# Patient Record
Sex: MALE | Race: WHITE | NOT HISPANIC OR LATINO | Employment: FULL TIME | URBAN - METROPOLITAN AREA
[De-identification: names, ages, dates, MRNs, and addresses within clinical notes are randomized per-mention and may not be internally consistent; named-entity substitution may affect disease eponyms.]

---

## 2021-11-17 ENCOUNTER — HOSPITAL ENCOUNTER (EMERGENCY)
Facility: HOSPITAL | Age: 36
Discharge: HOME | End: 2021-11-17
Attending: EMERGENCY MEDICINE | Admitting: EMERGENCY MEDICINE
Payer: COMMERCIAL

## 2021-11-17 VITALS
HEART RATE: 78 BPM | TEMPERATURE: 98.2 F | HEIGHT: 67 IN | BODY MASS INDEX: 26.37 KG/M2 | SYSTOLIC BLOOD PRESSURE: 141 MMHG | OXYGEN SATURATION: 100 % | RESPIRATION RATE: 18 BRPM | DIASTOLIC BLOOD PRESSURE: 94 MMHG | WEIGHT: 168 LBS

## 2021-11-17 DIAGNOSIS — K08.89 PAIN, DENTAL: Primary | ICD-10-CM

## 2021-11-17 PROCEDURE — 96372 THER/PROPH/DIAG INJ SC/IM: CPT

## 2021-11-17 PROCEDURE — 99284 EMERGENCY DEPT VISIT MOD MDM: CPT | Performed by: EMERGENCY MEDICINE

## 2021-11-17 PROCEDURE — 99282 EMERGENCY DEPT VISIT SF MDM: CPT

## 2021-11-17 RX ORDER — CHLORHEXIDINE GLUCONATE 0.12 MG/ML
15 RINSE ORAL 2 TIMES DAILY
Qty: 120 ML | Refills: 0 | Status: SHIPPED | OUTPATIENT
Start: 2021-11-17

## 2021-11-17 RX ORDER — KETOROLAC TROMETHAMINE 30 MG/ML
30 INJECTION, SOLUTION INTRAMUSCULAR; INTRAVENOUS ONCE
Status: COMPLETED | OUTPATIENT
Start: 2021-11-17 | End: 2021-11-17

## 2021-11-17 RX ADMIN — KETOROLAC TROMETHAMINE 30 MG: 30 INJECTION, SOLUTION INTRAMUSCULAR at 02:40

## 2022-04-10 ENCOUNTER — APPOINTMENT (EMERGENCY)
Dept: RADIOLOGY | Facility: HOSPITAL | Age: 37
End: 2022-04-10
Payer: COMMERCIAL

## 2022-04-10 ENCOUNTER — HOSPITAL ENCOUNTER (EMERGENCY)
Facility: HOSPITAL | Age: 37
Discharge: HOME | End: 2022-04-10
Attending: EMERGENCY MEDICINE | Admitting: EMERGENCY MEDICINE
Payer: COMMERCIAL

## 2022-04-10 ENCOUNTER — APPOINTMENT (EMERGENCY)
Dept: CT IMAGING | Facility: HOSPITAL | Age: 37
End: 2022-04-10
Payer: COMMERCIAL

## 2022-04-10 VITALS
BODY MASS INDEX: 26.37 KG/M2 | OXYGEN SATURATION: 97 % | DIASTOLIC BLOOD PRESSURE: 70 MMHG | WEIGHT: 168 LBS | SYSTOLIC BLOOD PRESSURE: 120 MMHG | HEART RATE: 95 BPM | HEIGHT: 67 IN | TEMPERATURE: 97.9 F | RESPIRATION RATE: 13 BRPM

## 2022-04-10 DIAGNOSIS — Q27.8 ABNORMALITY OF SYSTEMIC VEIN: ICD-10-CM

## 2022-04-10 DIAGNOSIS — R07.89 ATYPICAL CHEST PAIN: Primary | ICD-10-CM

## 2022-04-10 LAB
ALBUMIN SERPL BCP-MCNC: 3.7 G/DL (ref 3.5–5)
ALP SERPL-CCNC: 112 U/L (ref 46–116)
ALT SERPL W P-5'-P-CCNC: 54 U/L (ref 12–78)
ANION GAP SERPL CALCULATED.3IONS-SCNC: 3 MMOL/L (ref 4–13)
AST SERPL W P-5'-P-CCNC: 21 U/L (ref 5–45)
ATRIAL RATE: 95 BPM
ATRIAL RATE: 96 BPM
BASOPHILS # BLD AUTO: 0.04 THOUSANDS/ΜL (ref 0–0.1)
BASOPHILS NFR BLD AUTO: 0 % (ref 0–1)
BILIRUB SERPL-MCNC: 0.5 MG/DL (ref 0.2–1)
BILIRUB UR QL STRIP: NEGATIVE
BUN SERPL-MCNC: 12 MG/DL (ref 5–25)
CALCIUM SERPL-MCNC: 8.9 MG/DL (ref 8.3–10.1)
CARDIAC TROPONIN I PNL SERPL HS: <2 NG/L
CHLORIDE SERPL-SCNC: 101 MMOL/L (ref 100–108)
CLARITY UR: CLEAR
CO2 SERPL-SCNC: 29 MMOL/L (ref 21–32)
COLOR UR: YELLOW
CREAT SERPL-MCNC: 1.11 MG/DL (ref 0.6–1.3)
D DIMER PPP FEU-MCNC: 0.63 UG/ML FEU
EOSINOPHIL # BLD AUTO: 0.06 THOUSAND/ΜL (ref 0–0.61)
EOSINOPHIL NFR BLD AUTO: 1 % (ref 0–6)
ERYTHROCYTE [DISTWIDTH] IN BLOOD BY AUTOMATED COUNT: 12.6 % (ref 11.6–15.1)
GFR SERPL CREATININE-BSD FRML MDRD: 83 ML/MIN/1.73SQ M
GLUCOSE SERPL-MCNC: 98 MG/DL (ref 65–140)
GLUCOSE UR STRIP-MCNC: NEGATIVE MG/DL
HCT VFR BLD AUTO: 48.2 % (ref 36.5–49.3)
HGB BLD-MCNC: 16.3 G/DL (ref 12–17)
HGB UR QL STRIP.AUTO: NEGATIVE
IMM GRANULOCYTES # BLD AUTO: 0.06 THOUSAND/UL (ref 0–0.2)
IMM GRANULOCYTES NFR BLD AUTO: 1 % (ref 0–2)
KETONES UR STRIP-MCNC: NEGATIVE MG/DL
LEUKOCYTE ESTERASE UR QL STRIP: NEGATIVE
LIPASE SERPL-CCNC: 121 U/L (ref 73–393)
LYMPHOCYTES # BLD AUTO: 1.51 THOUSANDS/ΜL (ref 0.6–4.47)
LYMPHOCYTES NFR BLD AUTO: 14 % (ref 14–44)
MCH RBC QN AUTO: 29 PG (ref 26.8–34.3)
MCHC RBC AUTO-ENTMCNC: 33.8 G/DL (ref 31.4–37.4)
MCV RBC AUTO: 86 FL (ref 82–98)
MONOCYTES # BLD AUTO: 1.1 THOUSAND/ΜL (ref 0.17–1.22)
MONOCYTES NFR BLD AUTO: 10 % (ref 4–12)
NEUTROPHILS # BLD AUTO: 8.06 THOUSANDS/ΜL (ref 1.85–7.62)
NEUTS SEG NFR BLD AUTO: 74 % (ref 43–75)
NITRITE UR QL STRIP: NEGATIVE
NRBC BLD AUTO-RTO: 0 /100 WBCS
P AXIS: 64 DEGREES
P AXIS: 67 DEGREES
PH UR STRIP.AUTO: 6 [PH]
PLATELET # BLD AUTO: 343 THOUSANDS/UL (ref 149–390)
PMV BLD AUTO: 9.7 FL (ref 8.9–12.7)
POTASSIUM SERPL-SCNC: 3.6 MMOL/L (ref 3.5–5.3)
PR INTERVAL: 142 MS
PR INTERVAL: 148 MS
PROT SERPL-MCNC: 8.6 G/DL (ref 6.4–8.2)
PROT UR STRIP-MCNC: NEGATIVE MG/DL
QRS AXIS: 66 DEGREES
QRS AXIS: 69 DEGREES
QRSD INTERVAL: 86 MS
QRSD INTERVAL: 88 MS
QT INTERVAL: 334 MS
QT INTERVAL: 346 MS
QTC INTERVAL: 421 MS
QTC INTERVAL: 434 MS
RBC # BLD AUTO: 5.63 MILLION/UL (ref 3.88–5.62)
SODIUM SERPL-SCNC: 133 MMOL/L (ref 136–145)
SP GR UR STRIP.AUTO: >=1.03 (ref 1–1.03)
T WAVE AXIS: 49 DEGREES
T WAVE AXIS: 55 DEGREES
UROBILINOGEN UR QL STRIP.AUTO: 0.2 E.U./DL
VENTRICULAR RATE: 95 BPM
VENTRICULAR RATE: 96 BPM
WBC # BLD AUTO: 10.83 THOUSAND/UL (ref 4.31–10.16)

## 2022-04-10 PROCEDURE — 87591 N.GONORRHOEAE DNA AMP PROB: CPT

## 2022-04-10 PROCEDURE — 93005 ELECTROCARDIOGRAM TRACING: CPT

## 2022-04-10 PROCEDURE — 36415 COLL VENOUS BLD VENIPUNCTURE: CPT

## 2022-04-10 PROCEDURE — 71045 X-RAY EXAM CHEST 1 VIEW: CPT

## 2022-04-10 PROCEDURE — 81003 URINALYSIS AUTO W/O SCOPE: CPT

## 2022-04-10 PROCEDURE — 99285 EMERGENCY DEPT VISIT HI MDM: CPT

## 2022-04-10 PROCEDURE — 93010 ELECTROCARDIOGRAM REPORT: CPT | Performed by: INTERNAL MEDICINE

## 2022-04-10 PROCEDURE — 84484 ASSAY OF TROPONIN QUANT: CPT

## 2022-04-10 PROCEDURE — 83690 ASSAY OF LIPASE: CPT

## 2022-04-10 PROCEDURE — 96361 HYDRATE IV INFUSION ADD-ON: CPT

## 2022-04-10 PROCEDURE — 71275 CT ANGIOGRAPHY CHEST: CPT

## 2022-04-10 PROCEDURE — 85379 FIBRIN DEGRADATION QUANT: CPT

## 2022-04-10 PROCEDURE — 87491 CHLMYD TRACH DNA AMP PROBE: CPT

## 2022-04-10 PROCEDURE — 87636 SARSCOV2 & INF A&B AMP PRB: CPT

## 2022-04-10 PROCEDURE — 74177 CT ABD & PELVIS W/CONTRAST: CPT

## 2022-04-10 PROCEDURE — G1004 CDSM NDSC: HCPCS

## 2022-04-10 PROCEDURE — 85025 COMPLETE CBC W/AUTO DIFF WBC: CPT

## 2022-04-10 PROCEDURE — 96374 THER/PROPH/DIAG INJ IV PUSH: CPT

## 2022-04-10 PROCEDURE — 80053 COMPREHEN METABOLIC PANEL: CPT

## 2022-04-10 RX ORDER — ONDANSETRON 2 MG/ML
4 INJECTION INTRAMUSCULAR; INTRAVENOUS ONCE
Status: COMPLETED | OUTPATIENT
Start: 2022-04-10 | End: 2022-04-10

## 2022-04-10 RX ADMIN — SODIUM CHLORIDE 1000 ML: 0.9 INJECTION, SOLUTION INTRAVENOUS at 13:26

## 2022-04-10 RX ADMIN — ONDANSETRON 4 MG: 2 INJECTION INTRAMUSCULAR; INTRAVENOUS at 13:26

## 2022-04-10 RX ADMIN — IOHEXOL 100 ML: 350 INJECTION, SOLUTION INTRAVENOUS at 15:11

## 2022-04-10 NOTE — ED PROVIDER NOTES
History  Chief Complaint   Patient presents with    Chest Pain     Pt with chest pain x 2 days, denies injury, +n/-v/+d  Pt has been on amox  for a sinus infection  Patient is a 43 YOM who presents to the ED with chest pain, nausea, and diarrhea x2 days  His chest pain is described as a dull ache radiates into his left shoulder  He had been having recurrent congestion and sinusitis for greater than 2 weeks and was seen by [facility] on 4/03 where he was prescribed augmentin  He reports that his congestion has improved over the last few days  He has had a concurrent non-productive cough for the last several days, reports that the cough has decreased in frequency but is still present  He began having bouts of non-bloody diarrhea approximately 2 days ago with associated nausea, states he has had decreased appetite and has been drinking primarily water  Has had subjective fever/chills at home with one oral temperature reading yesterday of 100 3F  Additionally he's had slight increase in urinary frequency with burning  He has been feeling fatigued the last few days without muscle aches  He denies headache, shortness of breath, vomiting, lower leg or calf pain  Prior to Admission Medications   Prescriptions Last Dose Informant Patient Reported? Taking?   chlorhexidine (PERIDEX) 0 12 % solution   No No   Sig: Apply 15 mL to the mouth or throat 2 (two) times a day      Facility-Administered Medications: None       History reviewed  No pertinent past medical history  Past Surgical History:   Procedure Laterality Date    ROOT CANAL         History reviewed  No pertinent family history  I have reviewed and agree with the history as documented      E-Cigarette/Vaping    E-Cigarette Use Current Every Day User      E-Cigarette/Vaping Substances    Nicotine Yes     THC No     CBD No     Flavoring Yes     Other No     Unknown No      Social History     Tobacco Use    Smoking status: Former Smoker    Smokeless tobacco: Never Used   Vaping Use    Vaping Use: Every day    Substances: Nicotine, Flavoring   Substance Use Topics    Alcohol use: Yes     Comment: socially     Drug use: Never       Review of Systems   Constitutional: Positive for appetite change (Decreased appetite due to nausea), chills, fatigue and fever (subjective fever at home)  HENT: Negative for rhinorrhea and sore throat  Eyes: Negative for photophobia and visual disturbance  Respiratory: Positive for cough (dry cough)  Negative for apnea  Cardiovascular: Positive for chest pain  Negative for palpitations and leg swelling  Gastrointestinal: Positive for diarrhea (non-bloody) and nausea  Negative for abdominal pain, blood in stool and vomiting  Genitourinary: Positive for dysuria (burning sensation)  Negative for decreased urine volume and hematuria  Musculoskeletal: Negative for arthralgias, back pain, neck pain and neck stiffness  Skin: Negative for color change and pallor  Neurological: Negative for dizziness, light-headedness, numbness and headaches  Physical Exam  Physical Exam  Constitutional:       General: He is not in acute distress  Appearance: He is well-developed  He is not ill-appearing, toxic-appearing or diaphoretic  HENT:      Head: Normocephalic and atraumatic  Right Ear: Tympanic membrane normal       Left Ear: Tympanic membrane normal       Mouth/Throat:      Mouth: Mucous membranes are moist       Pharynx: Oropharynx is clear  No oropharyngeal exudate or posterior oropharyngeal erythema  Cardiovascular:      Rate and Rhythm: Regular rhythm  Tachycardia present  Pulses:           Radial pulses are 2+ on the right side and 2+ on the left side  Heart sounds: Normal heart sounds  No murmur heard  Pulmonary:      Effort: Pulmonary effort is normal  No accessory muscle usage or respiratory distress        Breath sounds: No decreased breath sounds, wheezing, rhonchi or rales  Abdominal:      General: Bowel sounds are normal       Palpations: Abdomen is soft  There is no mass  Tenderness: There is no abdominal tenderness  There is no right CVA tenderness, left CVA tenderness or guarding  Musculoskeletal:      Cervical back: Normal range of motion  Right lower leg: No tenderness  No edema  Left lower leg: No tenderness  No edema  Skin:     General: Skin is warm and dry  Neurological:      General: No focal deficit present  Mental Status: He is alert and oriented to person, place, and time           Vital Signs  ED Triage Vitals [04/10/22 1221]   Temperature Pulse Respirations Blood Pressure SpO2   97 9 °F (36 6 °C) (!) 106 16 126/89 98 %      Temp Source Heart Rate Source Patient Position - Orthostatic VS BP Location FiO2 (%)   Temporal Monitor Sitting Right arm --      Pain Score       2           Vitals:    04/10/22 1221 04/10/22 1300 04/10/22 1400   BP: 126/89 130/75 123/74   Pulse: (!) 106 87 90   Patient Position - Orthostatic VS: Sitting           Visual Acuity      ED Medications  Medications   ondansetron (ZOFRAN) injection 4 mg (4 mg Intravenous Given 4/10/22 1326)   sodium chloride 0 9 % bolus 1,000 mL (1,000 mL Intravenous New Bag 4/10/22 1326)   iohexol (OMNIPAQUE) 350 MG/ML injection (SINGLE-DOSE) 100 mL (100 mL Intravenous Given 4/10/22 1511)       Diagnostic Studies  Results Reviewed     Procedure Component Value Units Date/Time    HS Troponin I 4hr [[id number]]     Lab Status: No result Specimen: Blood     HS Troponin I 2hr [[id number]]     Lab Status: No result Specimen: Blood     HS Troponin 0hr (reflex protocol) [[id number]]  (Normal) Collected: 04/10/22 1334    Lab Status: Final result Specimen: Blood from Arm, Right Updated: 04/10/22 1422     hs TnI 0hr <2 ng/L     Comprehensive metabolic panel [[id number]]  (Abnormal) Collected: 04/10/22 1334    Lab Status: Final result Specimen: Blood from Arm, Right Updated: 04/10/22 1413     Sodium 133 mmol/L      Potassium 3 6 mmol/L      Chloride 101 mmol/L      CO2 29 mmol/L      ANION GAP 3 mmol/L      BUN 12 mg/dL      Creatinine 1 11 mg/dL      Glucose 98 mg/dL      Calcium 8 9 mg/dL      AST 21 U/L      ALT 54 U/L      Alkaline Phosphatase 112 U/L      Total Protein 8 6 g/dL      Albumin 3 7 g/dL      Total Bilirubin 0 50 mg/dL      eGFR 83 ml/min/1 73sq m     Narrative:      [place] guidelines for Chronic Kidney Disease (CKD):     Stage 1 with normal or high GFR (GFR > 90 mL/min/1 73 square meters)    Stage 2 Mild CKD (GFR = 60-89 mL/min/1 73 square meters)    Stage 3A Moderate CKD (GFR = 45-59 mL/min/1 73 square meters)    Stage 3B Moderate CKD (GFR = 30-44 mL/min/1 73 square meters)    Stage 4 Severe CKD (GFR = 15-29 mL/min/1 73 square meters)    Stage 5 End Stage CKD (GFR <15 mL/min/1 73 square meters)  Note: GFR calculation is accurate only with a steady state creatinine    Lipase [[id number]]  (Normal) Collected: 04/10/22 1334    Lab Status: Final result Specimen: Blood from Arm, Right Updated: 04/10/22 1413     Lipase 121 u/L     D-dimer, quantitative [[id number]]  (Abnormal) Collected: 04/10/22 1334    Lab Status: Final result Specimen: Blood from Arm, Right Updated: 04/10/22 1412     D-Dimer, Quant 0 63 ug/ml FEU     UA (URINE) with reflex to Scope [[id number]] Collected: 04/10/22 1333    Lab Status: Final result Specimen: Urine, Clean Catch Updated: 04/10/22 1348     Color, UA Yellow     Clarity, UA Clear     Specific Gravity, UA >=1 030     pH, UA 6 0     Leukocytes, UA Negative     Nitrite, UA Negative     Protein, UA Negative mg/dl      Glucose, UA Negative mg/dl      Ketones, UA Negative mg/dl      Urobilinogen, UA 0 2 E U /dl      Bilirubin, UA Negative     Blood, UA Negative    Chlamydia/GC amplified DNA by PCR [[id number]] Collected: 04/10/22 1333    Lab Status:  In process Specimen: Urine, Other Updated: 04/10/22 1344    COVID/FLU - 24 hour TAT [[id number]] Collected: 04/10/22 1333    Lab Status: In process Specimen: Nares from Nasopharyngeal Swab Updated: 04/10/22 1344    CBC and differential [[id number]]  (Abnormal) Collected: 04/10/22 1334    Lab Status: Final result Specimen: Blood from Arm, Right Updated: 04/10/22 1342     WBC 10 83 Thousand/uL      RBC 5 63 Million/uL      Hemoglobin 16 3 g/dL      Hematocrit 48 2 %      MCV 86 fL      MCH 29 0 pg      MCHC 33 8 g/dL      RDW 12 6 %      MPV 9 7 fL      Platelets 710 Thousands/uL      nRBC 0 /100 WBCs      Neutrophils Relative 74 %      Immat GRANS % 1 %      Lymphocytes Relative 14 %      Monocytes Relative 10 %      Eosinophils Relative 1 %      Basophils Relative 0 %      Neutrophils Absolute 8 06 Thousands/µL      Immature Grans Absolute 0 06 Thousand/uL      Lymphocytes Absolute 1 51 Thousands/µL      Monocytes Absolute 1 10 Thousand/µL      Eosinophils Absolute 0 06 Thousand/µL      Basophils Absolute 0 04 Thousands/µL                  PE Study with CT Abdomen and Pelvis with contrast   Final Result by Michelle Watt MD (04/10 1541)      1  Partial anomalous pulmonary venous return, 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  Nonemergent cardiology consultation is recommended for further evaluation   2  No evidence of pulmonary embolism   3  No acute pulmonary disease   4  Scattered air-fluid levels within the colon may correspond to a diarrheal illness  Otherwise no acute inflammatory changes in the abdomen or pelvis  The examination demonstrates a significant  finding and was documented as such in [facility] for liaison and referring practitioner immediate notification           Workstation performed: [id number]         X-ray chest 1 view portable   ED Interpretation by Iman Dominguez PA-C (04/10 1324)   No acute cardiopulmonary pathologies appreciated                 Procedures  ECG 12 Lead Documentation Only    Date/Time: 4/10/2022 2:34 PM  Performed by: Danielle Castellon PA-C  Authorized by: Danielle Castellon PA-C     Indications / Diagnosis:  Chest pain x2 days  ECG reviewed by me, the ED Provider: yes    Patient location:  ED  Previous ECG:     Previous ECG:  Unavailable  Interpretation:     Interpretation: normal    Rate:     ECG rate:  95    ECG rate assessment: normal    Rhythm:     Rhythm: sinus rhythm    Ectopy:     Ectopy: none    QRS:     QRS axis:  Normal  ST segments:     ST segments:  Non-specific  T waves:     T waves: normal               ED Course             HEART Risk Score      Most Recent Value   Heart Score Risk Calculator    History 0 Filed at: 04/10/2022 1530   ECG 1 Filed at: 04/10/2022 1530   Age 0 Filed at: 04/10/2022 1530   Risk Factors 0 Filed at: 04/10/2022 1530   Troponin 0 Filed at: 04/10/2022 1530   HEART Score 1 Filed at: 04/10/2022 1530                [address]  Rule for PE      Most Recent Value   PERC Rule for PE    Age >=50 0 Filed at: 04/10/2022 1430   HR >=100 1 Filed at: 04/10/2022 1430   O2 Sat on room air < 95% 0 Filed at: 04/10/2022 1430   History of PE or DVT 0 Filed at: 04/10/2022 1430   Recent trauma or surgery 0 Filed at: 04/10/2022 1430   Hemoptysis 0 Filed at: 04/10/2022 1430   Exogenous estrogen 0 Filed at: 04/10/2022 1430   Unilateral leg swelling 0 Filed at: 04/10/2022 1430   PERC Rule for PE Results 1 Filed at: 04/10/2022 1430              SBIRT 20yo+      Most Recent Value   SBIRT (23 yo +)    In order to provide better care to our patients, we are screening all of our patients for alcohol and drug use  Would it be okay to ask you these screening questions? 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  Nonemergent cardiology consultation is recommended for further evaluation "  Results discussed with patient, ambulatory referral to cardiology  UA negative for UTI, Urine GC in process will update patient if positive  Patient states nausea improved and he was "feeling better" after zofran and 1L fluid bolus  Patient advised to return to ED if chest pain worsens, SOB develops  Amount and/or Complexity of Data Reviewed  Clinical lab tests: ordered and reviewed  Tests in the radiology section of CPT®: ordered and reviewed  Discuss the patient with other providers: yes    Patient Progress  Patient progress: stable      Disposition  Final diagnoses:   Atypical chest pain   Abnormality of systemic vein     Time reflects when diagnosis was documented in both MDM as applicable and the Disposition within this note     Time User Action Codes Description Comment    4/10/2022  4:00 PM Penny Streeter Add [R07 89] Atypical chest pain     4/10/2022  4:16 PM Penny Streeter Add [Q27 8] Abnormality of systemic vein       ED Disposition     ED Disposition Condition Date/Time Comment    Discharge Stable Sun Apr 10, 2022  4:01 PM Kareen Payne discharge to home/self care              Follow-up Information     Follow up With Specialties Details Why Contact Info Additional [facility] Call in 1 day  [address] [facility] [address]  [address]  Cardiology [phone] [place], [place], [address] [phone]          Patient's Medications   Discharge Prescriptions    No medications on file           PDMP Review None          ED Provider  Electronically Signed by           Iman Dominguez PA-C  04/10/22 0464

## 2022-04-11 LAB
FLUAV RNA RESP QL NAA+PROBE: NEGATIVE
FLUBV RNA RESP QL NAA+PROBE: NEGATIVE
SARS-COV-2 RNA RESP QL NAA+PROBE: NEGATIVE

## 2022-04-14 LAB
C TRACH DNA SPEC QL NAA+PROBE: NEGATIVE
N GONORRHOEA DNA SPEC QL NAA+PROBE: NEGATIVE

## 2023-05-15 ENCOUNTER — HOSPITAL ENCOUNTER (INPATIENT)
Facility: HOSPITAL | Age: 38
LOS: 1 days | Discharge: HOME | End: 2023-05-18
Attending: EMERGENCY MEDICINE | Admitting: INTERNAL MEDICINE

## 2023-05-15 DIAGNOSIS — R19.7 NAUSEA VOMITING AND DIARRHEA: Primary | ICD-10-CM

## 2023-05-15 DIAGNOSIS — R94.31 PROLONGED Q-T INTERVAL ON ECG: ICD-10-CM

## 2023-05-15 DIAGNOSIS — R11.2 NAUSEA VOMITING AND DIARRHEA: Primary | ICD-10-CM

## 2023-05-15 DIAGNOSIS — R00.0 TACHYCARDIA: ICD-10-CM

## 2023-05-15 PROBLEM — K21.9 GERD (GASTROESOPHAGEAL REFLUX DISEASE): Status: ACTIVE | Noted: 2023-05-15

## 2023-05-15 PROBLEM — Z72.0 TOBACCO ABUSE: Status: ACTIVE | Noted: 2023-05-15

## 2023-05-15 PROBLEM — R74.8 ELEVATED CREATINE KINASE: Status: ACTIVE | Noted: 2023-05-15

## 2023-05-15 PROBLEM — B97.89 VIRAL SEPSIS (HCC): Status: ACTIVE | Noted: 2023-05-15

## 2023-05-15 PROBLEM — A41.89 VIRAL SEPSIS (HCC): Status: ACTIVE | Noted: 2023-05-15

## 2023-05-15 PROBLEM — F41.9 ANXIETY AND DEPRESSION: Status: ACTIVE | Noted: 2023-05-15

## 2023-05-15 PROBLEM — Q26.3 PARTIAL ANOMALOUS PULMONARY VENOUS RETURN (PAPVR): Status: ACTIVE | Noted: 2023-05-15

## 2023-05-15 PROBLEM — F32.A ANXIETY AND DEPRESSION: Status: ACTIVE | Noted: 2023-05-15

## 2023-05-15 LAB
2HR DELTA HS TROPONIN: 2 NG/L
ALBUMIN SERPL BCP-MCNC: 4.9 G/DL (ref 3.5–5)
ALP SERPL-CCNC: 63 U/L (ref 34–104)
ALT SERPL W P-5'-P-CCNC: 48 U/L (ref 7–52)
ANION GAP SERPL CALCULATED.3IONS-SCNC: 12 MMOL/L (ref 4–13)
AST SERPL W P-5'-P-CCNC: 21 U/L (ref 13–39)
BASOPHILS # BLD AUTO: 0.03 THOUSANDS/ÂΜL (ref 0–0.1)
BASOPHILS NFR BLD AUTO: 0 % (ref 0–1)
BILIRUB SERPL-MCNC: 1.11 MG/DL (ref 0.2–1)
BUN SERPL-MCNC: 30 MG/DL (ref 5–25)
CALCIUM SERPL-MCNC: 9.3 MG/DL (ref 8.4–10.2)
CARDIAC TROPONIN I PNL SERPL HS: 3 NG/L
CARDIAC TROPONIN I PNL SERPL HS: 5 NG/L
CHLORIDE SERPL-SCNC: 96 MMOL/L (ref 96–108)
CO2 SERPL-SCNC: 26 MMOL/L (ref 21–32)
CREAT SERPL-MCNC: 1.38 MG/DL (ref 0.6–1.3)
D DIMER PPP FEU-MCNC: 0.32 UG/ML FEU
EOSINOPHIL # BLD AUTO: 0 THOUSAND/ÂΜL (ref 0–0.61)
EOSINOPHIL NFR BLD AUTO: 0 % (ref 0–6)
ERYTHROCYTE [DISTWIDTH] IN BLOOD BY AUTOMATED COUNT: 12.8 % (ref 11.6–15.1)
GFR SERPL CREATININE-BSD FRML MDRD: 63 ML/MIN/1.73SQ M
GLUCOSE SERPL-MCNC: 113 MG/DL (ref 65–140)
HCT VFR BLD AUTO: 50.8 % (ref 36.5–49.3)
HGB BLD-MCNC: 17.1 G/DL (ref 12–17)
IMM GRANULOCYTES # BLD AUTO: 0.07 THOUSAND/UL (ref 0–0.2)
IMM GRANULOCYTES NFR BLD AUTO: 0 % (ref 0–2)
LIPASE SERPL-CCNC: 19 U/L (ref 11–82)
LYMPHOCYTES # BLD AUTO: 0.79 THOUSANDS/ÂΜL (ref 0.6–4.47)
LYMPHOCYTES NFR BLD AUTO: 5 % (ref 14–44)
MCH RBC QN AUTO: 29 PG (ref 26.8–34.3)
MCHC RBC AUTO-ENTMCNC: 33.7 G/DL (ref 31.4–37.4)
MCV RBC AUTO: 86 FL (ref 82–98)
MONOCYTES # BLD AUTO: 1.74 THOUSAND/ÂΜL (ref 0.17–1.22)
MONOCYTES NFR BLD AUTO: 10 % (ref 4–12)
NEUTROPHILS # BLD AUTO: 14.23 THOUSANDS/ÂΜL (ref 1.85–7.62)
NEUTS SEG NFR BLD AUTO: 85 % (ref 43–75)
NRBC BLD AUTO-RTO: 0 /100 WBCS
PLATELET # BLD AUTO: 252 THOUSANDS/UL (ref 149–390)
PMV BLD AUTO: 10.5 FL (ref 8.9–12.7)
POTASSIUM SERPL-SCNC: 3.4 MMOL/L (ref 3.5–5.3)
PROT SERPL-MCNC: 8.5 G/DL (ref 6.4–8.4)
RBC # BLD AUTO: 5.9 MILLION/UL (ref 3.88–5.62)
SODIUM SERPL-SCNC: 134 MMOL/L (ref 135–147)
WBC # BLD AUTO: 16.86 THOUSAND/UL (ref 4.31–10.16)

## 2023-05-15 RX ORDER — BUPROPION HYDROCHLORIDE 150 MG/1
150 TABLET ORAL
Status: DISCONTINUED | OUTPATIENT
Start: 2023-05-16 | End: 2023-05-18 | Stop reason: HOSPADM

## 2023-05-15 RX ORDER — SODIUM CHLORIDE 9 MG/ML
100 INJECTION, SOLUTION INTRAVENOUS CONTINUOUS
Status: DISCONTINUED | OUTPATIENT
Start: 2023-05-15 | End: 2023-05-17

## 2023-05-15 RX ORDER — ESCITALOPRAM OXALATE 10 MG/1
20 TABLET ORAL
Status: DISCONTINUED | OUTPATIENT
Start: 2023-05-16 | End: 2023-05-18 | Stop reason: HOSPADM

## 2023-05-15 RX ORDER — DIPHENHYDRAMINE HYDROCHLORIDE 50 MG/ML
25 INJECTION INTRAMUSCULAR; INTRAVENOUS ONCE
Status: COMPLETED | OUTPATIENT
Start: 2023-05-15 | End: 2023-05-15

## 2023-05-15 RX ORDER — PANTOPRAZOLE SODIUM 40 MG/10ML
40 INJECTION, POWDER, LYOPHILIZED, FOR SOLUTION INTRAVENOUS
Status: DISCONTINUED | OUTPATIENT
Start: 2023-05-15 | End: 2023-05-18 | Stop reason: HOSPADM

## 2023-05-15 RX ORDER — BUPROPION HYDROCHLORIDE 150 MG/1
150 TABLET ORAL
COMMUNITY

## 2023-05-15 RX ORDER — MAGNESIUM SULFATE 1 G/100ML
1 INJECTION INTRAVENOUS ONCE
Status: COMPLETED | OUTPATIENT
Start: 2023-05-15 | End: 2023-05-16

## 2023-05-15 RX ORDER — ESCITALOPRAM OXALATE 20 MG/1
20 TABLET ORAL
COMMUNITY

## 2023-05-15 RX ORDER — POTASSIUM CHLORIDE 20 MEQ/1
40 TABLET, EXTENDED RELEASE ORAL ONCE
Status: COMPLETED | OUTPATIENT
Start: 2023-05-15 | End: 2023-05-15

## 2023-05-15 RX ORDER — ACETAMINOPHEN 325 MG/1
650 TABLET ORAL EVERY 6 HOURS PRN
Status: DISCONTINUED | OUTPATIENT
Start: 2023-05-15 | End: 2023-05-18 | Stop reason: HOSPADM

## 2023-05-15 RX ORDER — ONDANSETRON 2 MG/ML
4 INJECTION INTRAMUSCULAR; INTRAVENOUS ONCE
Status: COMPLETED | OUTPATIENT
Start: 2023-05-15 | End: 2023-05-15

## 2023-05-15 RX ADMIN — ONDANSETRON 4 MG: 2 INJECTION INTRAMUSCULAR; INTRAVENOUS at 18:10

## 2023-05-15 RX ADMIN — SODIUM CHLORIDE 100 ML/HR: 0.9 INJECTION, SOLUTION INTRAVENOUS at 22:30

## 2023-05-15 RX ADMIN — POTASSIUM CHLORIDE 40 MEQ: 1500 TABLET, EXTENDED RELEASE ORAL at 23:54

## 2023-05-15 RX ADMIN — PANTOPRAZOLE SODIUM 40 MG: 40 INJECTION, POWDER, FOR SOLUTION INTRAVENOUS at 22:30

## 2023-05-15 RX ADMIN — DIPHENHYDRAMINE HYDROCHLORIDE 25 MG: 50 INJECTION, SOLUTION INTRAMUSCULAR; INTRAVENOUS at 22:55

## 2023-05-15 RX ADMIN — SODIUM CHLORIDE 1000 ML: 0.9 INJECTION, SOLUTION INTRAVENOUS at 20:00

## 2023-05-15 RX ADMIN — MAGNESIUM SULFATE IN DEXTROSE 1 G: 10 INJECTION, SOLUTION INTRAVENOUS at 23:54

## 2023-05-15 RX ADMIN — TRIMETHOBENZAMIDE HYDROCHLORIDE 200 MG: 100 INJECTION INTRAMUSCULAR at 19:07

## 2023-05-15 RX ADMIN — SODIUM CHLORIDE 1000 ML: 0.9 INJECTION, SOLUTION INTRAVENOUS at 18:09

## 2023-05-15 NOTE — ED PROVIDER NOTES
History  Chief Complaint   Patient presents with   • Vomiting     Pt to ED from home c/o vomiting and diarrhea x 2 days  No sick contacts, states he had wings from [employer] a few days ago and then got sick  Wife had same wings but she is not vomiting  Pt states he is barely able to keep anything down, forcing fluids but feels very nauseous     Patient is a 49-year-old male who presents with nausea, vomiting, diarrhea x48 hours  Patient states that his son was sick last week, but then recovered  Currently no one else is sick around him  He states that 2 days ago he developed nausea, multiple episodes of nonbloody, nonbilious emesis as well as numerous episodes of nonbloody, nonmucoid diarrhea  He states that anytime he tries to eat or drink anything he immediately vomits it back up  He has been feeling extremely tired due to all of this  He denies any abdominal pain  Prior to Admission Medications   Prescriptions Last Dose Informant Patient Reported? Taking?   chlorhexidine (PERIDEX) 0 12 % solution   No No   Sig: Apply 15 mL to the mouth or throat 2 (two) times a day      Facility-Administered Medications: None       Past Medical History:   Diagnosis Date   • ADHD    • Anxiety        Past Surgical History:   Procedure Laterality Date   • ROOT CANAL         History reviewed  No pertinent family history  I have reviewed and agree with the history as documented  E-Cigarette/Vaping   • E-Cigarette Use Current Every Day User      E-Cigarette/Vaping Substances   • Nicotine Yes    • THC No    • CBD No    • Flavoring Yes    • Other No    • Unknown No      Social History     Tobacco Use   • Smoking status: Former   • Smokeless tobacco: Never   Vaping Use   • Vaping Use: Every day   • Substances: Nicotine, Flavoring   Substance Use Topics   • Alcohol use: Yes     Comment: socially    • Drug use: Never       Review of Systems   Constitutional: Negative for chills and fever     Respiratory: Negative for shortness of breath  Cardiovascular: Negative for chest pain  Gastrointestinal: Positive for diarrhea, nausea and vomiting  Negative for abdominal distention, abdominal pain, blood in stool and constipation  Genitourinary: Negative for dysuria, flank pain and hematuria  Neurological: Negative for dizziness, syncope and light-headedness  Physical Exam  Physical Exam  Vitals and nursing note reviewed  Constitutional:       General: He is not in acute distress  Appearance: Normal appearance  He is not ill-appearing, toxic-appearing or diaphoretic  HENT:      Head: Normocephalic and atraumatic  Mouth/Throat:      Mouth: Mucous membranes are moist    Eyes:      Conjunctiva/sclera: Conjunctivae normal       Pupils: Pupils are equal, round, and reactive to light  Cardiovascular:      Rate and Rhythm: Regular rhythm  Tachycardia present  Pulses: Normal pulses  Heart sounds: Normal heart sounds  No murmur heard  Pulmonary:      Effort: Pulmonary effort is normal  No respiratory distress  Breath sounds: Normal breath sounds  No stridor  No wheezing, rhonchi or rales  Chest:      Chest wall: No tenderness  Abdominal:      General: Bowel sounds are normal  There is no distension  Palpations: Abdomen is soft  Tenderness: There is no abdominal tenderness  There is no right CVA tenderness, left CVA tenderness, guarding or rebound  Skin:     General: Skin is warm and dry  Neurological:      General: No focal deficit present  Mental Status: He is alert and oriented to person, place, and time  Mental status is at baseline     Psychiatric:         Mood and Affect: Mood normal          Behavior: Behavior normal          Vital Signs  ED Triage Vitals   Temperature Pulse Respirations Blood Pressure SpO2   05/15/23 1756 05/15/23 1756 05/15/23 1756 05/15/23 1756 05/15/23 1756   98 °F (36 7 °C) (!) 130 18 137/94 99 %      Temp src Heart Rate Source Patient Position - Orthostatic VS BP Location FiO2 (%)   -- 05/15/23 1830 05/15/23 1756 05/15/23 1756 --    Monitor Sitting Left arm       Pain Score       05/15/23 1756       9           Vitals:    05/15/23 1830 05/15/23 1930 05/15/23 2000 05/15/23 2030   BP: 130/80 133/79 134/79 133/73   Pulse: (!) 130 (!) 123 (!) 122 (!) 128   Patient Position - Orthostatic VS: Sitting Sitting Sitting          Visual Acuity      ED Medications  Medications   sodium chloride 0 9 % bolus 1,000 mL (0 mL Intravenous Stopped 5/15/23 1930)   ondansetron (ZOFRAN) injection 4 mg (4 mg Intravenous Given 5/15/23 1810)   trimethobenzamide (TIGAN) IM injection 200 mg (200 mg Intramuscular Given 5/15/23 1907)   sodium chloride 0 9 % bolus 1,000 mL (1,000 mL Intravenous New Bag 5/15/23 2000)       Diagnostic Studies  Results Reviewed     Procedure Component Value Units Date/Time    HS Troponin I 2hr [[id number]] Collected: 05/15/23 2122    Lab Status:  In process Specimen: Blood from Arm, Right Updated: 05/15/23 2124    HS Troponin I 4hr [[id number]]     Lab Status: No result Specimen: Blood     HS Troponin 0hr (reflex protocol) [[id number]]  (Normal) Collected: 05/15/23 1907    Lab Status: Final result Specimen: Blood from Arm, Right Updated: 05/15/23 1938     hs TnI 0hr 3 ng/L     D-dimer, quantitative [[id number]]  (Normal) Collected: 05/15/23 1907    Lab Status: Final result Specimen: Blood from Arm, Right Updated: 05/15/23 1932     D-Dimer, Quant 0 32 ug/ml FEU     Comprehensive metabolic panel [[id number]]  (Abnormal) Collected: 05/15/23 1809    Lab Status: Final result Specimen: Blood from Arm, Right Updated: 05/15/23 1836     Sodium 134 mmol/L      Potassium 3 4 mmol/L      Chloride 96 mmol/L      CO2 26 mmol/L      ANION GAP 12 mmol/L      BUN 30 mg/dL      Creatinine 1 38 mg/dL      Glucose 113 mg/dL      Calcium 9 3 mg/dL      AST 21 U/L      ALT 48 U/L      Alkaline Phosphatase 63 U/L      Total Protein 8 5 g/dL      Albumin 4 9 g/dL      Total Bilirubin 1 11 mg/dL      eGFR 63 ml/min/1 73sq m     Narrative:      [place] guidelines for Chronic Kidney Disease (CKD):   •  Stage 1 with normal or high GFR (GFR > 90 mL/min/1 73 square meters)  •  Stage 2 Mild CKD (GFR = 60-89 mL/min/1 73 square meters)  •  Stage 3A Moderate CKD (GFR = 45-59 mL/min/1 73 square meters)  •  Stage 3B Moderate CKD (GFR = 30-44 mL/min/1 73 square meters)  •  Stage 4 Severe CKD (GFR = 15-29 mL/min/1 73 square meters)  •  Stage 5 End Stage CKD (GFR <15 mL/min/1 73 square meters)  Note: GFR calculation is accurate only with a steady state creatinine    Lipase [[id number]]  (Normal) Collected: 05/15/23 1809    Lab Status: Final result Specimen: Blood from Arm, Right Updated: 05/15/23 1836     Lipase 19 u/L     CBC and differential [[id number]]  (Abnormal) Collected: 05/15/23 1809    Lab Status: Final result Specimen: Blood from Arm, Right Updated: 05/15/23 1817     WBC 16 86 Thousand/uL      RBC 5 90 Million/uL      Hemoglobin 17 1 g/dL      Hematocrit 50 8 %      MCV 86 fL      MCH 29 0 pg      MCHC 33 7 g/dL      RDW 12 8 %      MPV 10 5 fL      Platelets 876 Thousands/uL      nRBC 0 /100 WBCs      Neutrophils Relative 85 %      Immat GRANS % 0 %      Lymphocytes Relative 5 %      Monocytes Relative 10 %      Eosinophils Relative 0 %      Basophils Relative 0 %      Neutrophils Absolute 14 23 Thousands/µL      Immature Grans Absolute 0 07 Thousand/uL      Lymphocytes Absolute 0 79 Thousands/µL      Monocytes Absolute 1 74 Thousand/µL      Eosinophils Absolute 0 00 Thousand/µL      Basophils Absolute 0 03 Thousands/µL                  No orders to display              Procedures  ECG 12 Lead Documentation Only    Date/Time: 5/15/2023 9:42 PM  Performed by: Yaneli Healy DO  Authorized by: Yaneli Healy DO     Indications / Diagnosis:  Tachycardia  ECG reviewed by me, the ED Provider: yes    Patient location:  ED  Previous ECG:     Previous ECG:  Compared to current    Comparison ECG info:  4/10/22    Similarity:  Changes noted  Interpretation:     Interpretation: non-specific    Rate:     ECG rate:  118    ECG rate assessment: tachycardic    Rhythm:     Rhythm: sinus tachycardia    Ectopy:     Ectopy: none    QRS:     QRS axis:  Normal    QRS intervals:  Normal  Conduction:     Conduction: normal    ST segments:     ST segments:  Non-specific  T waves:     T waves: non-specific    Other findings:     Other findings: prolonged qTc interval    Comments:      qtc 627  ECG 12 Lead Documentation Only    Date/Time: 5/15/2023 8:17 PM  Performed by: Ky Coates DO  Authorized by: Ky Coates DO     Indications / Diagnosis:  Repeat  ECG reviewed by me, the ED Provider: yes    Patient location:  ED  Previous ECG:     Previous ECG:  Compared to current    Comparison ECG info:  1 hour ago    Similarity:  No change  Interpretation:     Interpretation: non-specific    Rate:     ECG rate:  132    ECG rate assessment: tachycardic    Rhythm:     Rhythm: sinus tachycardia    Ectopy:     Ectopy: none    QRS:     QRS axis:  Normal    QRS intervals:  Normal  Conduction:     Conduction: normal    ST segments:     ST segments:  Non-specific  T waves:     T waves: non-specific    Comments:      ANISA [id number]             ED Course  ED Course as of 05/15/23 2147   Mon May 15, 2023   1937 D-Dimer, Quant: 0 32   1946 hs TnI 0hr: 3   2100 Sent EKGs to Dr Bam Saldana, cardiology who does not believe that the QTc is truly prolonged, but that the computer is reading the p-wave as part of the interval     2115 Despite 2L of IVF, patient continues to be tachycardic and have nausea and had another episode of vomiting  As such, patient will require admission to the hospital for continued symptom management and fluid resuscitation  Will                                SBIRT 22yo+    Flowsheet Row Most Recent Value   Initial Alcohol Screen: US AUDIT-C     1   How often do you have a drink containing alcohol? 0 Filed at: 05/15/2023 1758   2  How many drinks containing alcohol do you have on a typical day you are drinking? 0 Filed at: 05/15/2023 1758   3a  Male UNDER 65: How often do you have five or more drinks on one occasion? 0 Filed at: 05/15/2023 1758   3b  FEMALE Any Age, or MALE 65+: How often do you have 4 or more drinks on one occassion? 0 Filed at: 05/15/2023 1758   Audit-C Score 0 Filed at: 05/15/2023 1758   [place]: How many times in the past year have you    Used an illegal drug or used a prescription medication for non-medical reasons? Never Filed at: 05/15/2023 1758                    Medical Decision Making  Assessment and plan:  Nausea, vomiting, diarrhea  Differential includes enteritis versus colitis  Tachycardia likely in setting of dehydration  Will get labs to evaluate for electrolyte abnormalities, check kidney and liver function as well as leukocytosis and anemia; urinalysis to rule out urinary tract infection  As patient has no abdominal pain, will not get CT scan of the abdomen and pelvis at this time unless patient develops abdominal pain/fever or has significantly abnormal labs  We will start resuscitation with antiemetics and fluids  Despite 2 L of fluids, the patient continues to be tachycardic  His labs do suggest significant hemoconcentration consistent with dehydration  His EKG is also concerning for QT prolongation and likely rate related nonspecific ST-T wave changes  Discussed case with cardiology in terms of the QT prolongation and this might be falsely being read as prolonged due to computer reading the P wave as part of the interval   In any case, as the patient continues to be tachycardic and have nausea/ vomiting despite resuscitation, will admit for further fluids and nausea/vomiting management  Review of medical records from care everywhere shows that the patient has a past medical history significant for PAPVR, anxiety      Amount and/or Complexity of Data Reviewed  Labs: ordered  Decision-making details documented in ED Course  Risk  Prescription drug management  Decision regarding hospitalization  Disposition  Final diagnoses:   Nausea vomiting and diarrhea   Tachycardia   Prolonged Q-T interval on ECG     Time reflects when diagnosis was documented in both MDM as applicable and the Disposition within this note     Time User Action Codes Description Comment    5/15/2023  8:25 PM Maik Meza [R11 2,  R19 7] Nausea vomiting and diarrhea     5/15/2023  8:25 PM RunPieceable Add [R00 0] Tachycardia     5/15/2023  8:25 PM [employer] Paige Add [R94 31] Prolonged Q-T interval on ECG       ED Disposition     ED Disposition   Admit    Condition   Stable    Date/Time   Mon May 15, 2023  8:25 PM    Comment   Case was discussed with hospitalist and the patient's admission status was agreed to be Admission Status: observation status to the service of Dr Ruiz Varma   Follow-up Information    None         Patient's Medications   Discharge Prescriptions    No medications on file       No discharge procedures on file      PDMP Review     None          ED Provider  Electronically Signed by           Montez Son DO  05/15/23 2425

## 2023-05-16 ENCOUNTER — APPOINTMENT (OUTPATIENT)
Dept: CT IMAGING | Facility: HOSPITAL | Age: 38
End: 2023-05-16

## 2023-05-16 ENCOUNTER — APPOINTMENT (OUTPATIENT)
Dept: RADIOLOGY | Facility: HOSPITAL | Age: 38
End: 2023-05-16

## 2023-05-16 PROBLEM — R79.89 ELEVATED SERUM CREATININE: Status: ACTIVE | Noted: 2023-05-15

## 2023-05-16 LAB
ANION GAP SERPL CALCULATED.3IONS-SCNC: 5 MMOL/L (ref 4–13)
ANION GAP SERPL CALCULATED.3IONS-SCNC: 7 MMOL/L (ref 4–13)
BACTERIA UR QL AUTO: ABNORMAL /HPF
BASOPHILS # BLD AUTO: 0.02 THOUSANDS/ÂΜL (ref 0–0.1)
BASOPHILS NFR BLD AUTO: 0 % (ref 0–1)
BILIRUB UR QL STRIP: NEGATIVE
BUN SERPL-MCNC: 19 MG/DL (ref 5–25)
BUN SERPL-MCNC: 22 MG/DL (ref 5–25)
CALCIUM SERPL-MCNC: 7.9 MG/DL (ref 8.4–10.2)
CALCIUM SERPL-MCNC: 8 MG/DL (ref 8.4–10.2)
CHLORIDE SERPL-SCNC: 102 MMOL/L (ref 96–108)
CHLORIDE SERPL-SCNC: 105 MMOL/L (ref 96–108)
CLARITY UR: CLEAR
CO2 SERPL-SCNC: 24 MMOL/L (ref 21–32)
CO2 SERPL-SCNC: 25 MMOL/L (ref 21–32)
COLOR UR: YELLOW
CREAT SERPL-MCNC: 1.1 MG/DL (ref 0.6–1.3)
CREAT SERPL-MCNC: 1.18 MG/DL (ref 0.6–1.3)
EOSINOPHIL # BLD AUTO: 0 THOUSAND/ÂΜL (ref 0–0.61)
EOSINOPHIL NFR BLD AUTO: 0 % (ref 0–6)
ERYTHROCYTE [DISTWIDTH] IN BLOOD BY AUTOMATED COUNT: 13.2 % (ref 11.6–15.1)
FLUAV RNA RESP QL NAA+PROBE: NEGATIVE
FLUBV RNA RESP QL NAA+PROBE: NEGATIVE
GFR SERPL CREATININE-BSD FRML MDRD: 77 ML/MIN/1.73SQ M
GFR SERPL CREATININE-BSD FRML MDRD: 84 ML/MIN/1.73SQ M
GLUCOSE SERPL-MCNC: 119 MG/DL (ref 65–140)
GLUCOSE SERPL-MCNC: 94 MG/DL (ref 65–140)
GLUCOSE UR STRIP-MCNC: NEGATIVE MG/DL
HCT VFR BLD AUTO: 43.9 % (ref 36.5–49.3)
HGB BLD-MCNC: 14.4 G/DL (ref 12–17)
HGB UR QL STRIP.AUTO: ABNORMAL
IMM GRANULOCYTES # BLD AUTO: 0.03 THOUSAND/UL (ref 0–0.2)
IMM GRANULOCYTES NFR BLD AUTO: 0 % (ref 0–2)
KETONES UR STRIP-MCNC: ABNORMAL MG/DL
LACTATE SERPL-SCNC: 1 MMOL/L (ref 0.5–2)
LEUKOCYTE ESTERASE UR QL STRIP: NEGATIVE
LYMPHOCYTES # BLD AUTO: 0.47 THOUSANDS/ÂΜL (ref 0.6–4.47)
LYMPHOCYTES NFR BLD AUTO: 5 % (ref 14–44)
MCH RBC QN AUTO: 29 PG (ref 26.8–34.3)
MCHC RBC AUTO-ENTMCNC: 32.8 G/DL (ref 31.4–37.4)
MCV RBC AUTO: 88 FL (ref 82–98)
MONOCYTES # BLD AUTO: 1.08 THOUSAND/ÂΜL (ref 0.17–1.22)
MONOCYTES NFR BLD AUTO: 12 % (ref 4–12)
NEUTROPHILS # BLD AUTO: 7.54 THOUSANDS/ÂΜL (ref 1.85–7.62)
NEUTS SEG NFR BLD AUTO: 83 % (ref 43–75)
NITRITE UR QL STRIP: NEGATIVE
NON-SQ EPI CELLS URNS QL MICRO: ABNORMAL /HPF
NRBC BLD AUTO-RTO: 0 /100 WBCS
PH UR STRIP.AUTO: 6 [PH]
PLATELET # BLD AUTO: 159 THOUSANDS/UL (ref 149–390)
PMV BLD AUTO: 10.3 FL (ref 8.9–12.7)
POTASSIUM SERPL-SCNC: 3.7 MMOL/L (ref 3.5–5.3)
POTASSIUM SERPL-SCNC: 4 MMOL/L (ref 3.5–5.3)
PROCALCITONIN SERPL-MCNC: 0.18 NG/ML
PROT UR STRIP-MCNC: ABNORMAL MG/DL
RBC # BLD AUTO: 4.97 MILLION/UL (ref 3.88–5.62)
RBC #/AREA URNS AUTO: ABNORMAL /HPF
RSV RNA RESP QL NAA+PROBE: NEGATIVE
SARS-COV-2 RNA RESP QL NAA+PROBE: NEGATIVE
SODIUM SERPL-SCNC: 133 MMOL/L (ref 135–147)
SODIUM SERPL-SCNC: 135 MMOL/L (ref 135–147)
SP GR UR STRIP.AUTO: >=1.03 (ref 1–1.03)
UROBILINOGEN UR STRIP-ACNC: <2 MG/DL
WBC # BLD AUTO: 9.14 THOUSAND/UL (ref 4.31–10.16)
WBC #/AREA URNS AUTO: ABNORMAL /HPF

## 2023-05-16 RX ORDER — IBUPROFEN 600 MG/1
600 TABLET ORAL EVERY 6 HOURS PRN
Status: DISCONTINUED | OUTPATIENT
Start: 2023-05-16 | End: 2023-05-18 | Stop reason: HOSPADM

## 2023-05-16 RX ADMIN — ACETAMINOPHEN 650 MG: 325 TABLET, FILM COATED ORAL at 17:20

## 2023-05-16 RX ADMIN — ESCITALOPRAM OXALATE 20 MG: 10 TABLET ORAL at 21:10

## 2023-05-16 RX ADMIN — TRIMETHOBENZAMIDE HYDROCHLORIDE 100 MG: 100 INJECTION INTRAMUSCULAR at 17:01

## 2023-05-16 RX ADMIN — BUPROPION HYDROCHLORIDE 150 MG: 150 TABLET, EXTENDED RELEASE ORAL at 21:10

## 2023-05-16 RX ADMIN — ACETAMINOPHEN 650 MG: 325 TABLET, FILM COATED ORAL at 00:32

## 2023-05-16 RX ADMIN — IOHEXOL 80 ML: 350 INJECTION, SOLUTION INTRAVENOUS at 22:41

## 2023-05-16 RX ADMIN — SODIUM CHLORIDE 100 ML/HR: 0.9 INJECTION, SOLUTION INTRAVENOUS at 19:37

## 2023-05-16 RX ADMIN — IBUPROFEN 600 MG: 600 TABLET, FILM COATED ORAL at 19:12

## 2023-05-16 RX ADMIN — IBUPROFEN 600 MG: 600 TABLET, FILM COATED ORAL at 02:09

## 2023-05-16 RX ADMIN — SODIUM CHLORIDE 100 ML/HR: 0.9 INJECTION, SOLUTION INTRAVENOUS at 09:32

## 2023-05-16 RX ADMIN — PANTOPRAZOLE SODIUM 40 MG: 40 INJECTION, POWDER, FOR SOLUTION INTRAVENOUS at 08:32

## 2023-05-16 NOTE — ASSESSMENT & PLAN NOTE
· N/V/D x 2 days  Diarrhea has improved  Continues to have nausea and vomiting   · SIRS criteria:   · Tachycardia, fever, leukocytosis  · Lactic and procal ordered    · SOI: Suspected viral GI illness   · ABx: None  Monitor off abx   · Stool studies ordered  · IV fluids  Continue to monitor labs  If WBC does not improve despite IV fluids consider initiating abx

## 2023-05-16 NOTE — PLAN OF CARE
Problem: PAIN - ADULT  Goal: Verbalizes/displays adequate comfort level or baseline comfort level  Description: Interventions:  - Encourage patient to monitor pain and request assistance  - Assess pain using appropriate pain scale  - Administer analgesics based on type and severity of pain and evaluate response  - Implement non-pharmacological measures as appropriate and evaluate response  - Consider cultural and social influences on pain and pain management  - Notify physician/advanced practitioner if interventions unsuccessful or patient reports new pain  Outcome: Progressing     Problem: INFECTION - ADULT  Goal: Absence or prevention of progression during hospitalization  Description: INTERVENTIONS:  - Assess and monitor for signs and symptoms of infection  - Monitor lab/diagnostic results  - Monitor all insertion sites, i e  indwelling lines, tubes, and drains  - Monitor endotracheal if appropriate and nasal secretions for changes in amount and color  - [place] appropriate cooling/warming therapies per order  - Administer medications as ordered  - Instruct and encourage patient and family to use good hand hygiene technique  - Identify and instruct in appropriate isolation precautions for identified infection/condition  Outcome: Progressing  Goal: Absence of fever/infection during neutropenic period  Description: INTERVENTIONS:  - Monitor WBC    Outcome: Progressing     Problem: SAFETY ADULT  Goal: Maintain or return to baseline ADL function  Description: INTERVENTIONS:  -  Assess patient's ability to carry out ADLs; assess patient's baseline for ADL function and identify physical deficits which impact ability to perform ADLs (bathing, care of mouth/teeth, toileting, grooming, dressing, etc )  - Assess/evaluate cause of self-care deficits   - Assess range of motion  - Assess patient's mobility; develop plan if impaired  - Assess patient's need for assistive devices and provide as appropriate  - Encourage maximum independence but intervene and supervise when necessary  - Involve family in performance of ADLs  - Assess for home care needs following discharge   - Consider OT consult to assist with ADL evaluation and planning for discharge  - Provide patient education as appropriate  Outcome: Progressing     Problem: DISCHARGE PLANNING  Goal: Discharge to home or other facility with appropriate resources  Description: INTERVENTIONS:  - Identify barriers to discharge w/patient and caregiver  - Arrange for needed discharge resources and transportation as appropriate  - Identify discharge learning needs (meds, wound care, etc )  - Arrange for interpretive services to assist at discharge as needed  - Refer to Case Management Department for coordinating discharge planning if the patient needs post-hospital services based on physician/advanced practitioner order or complex needs related to functional status, cognitive ability, or social support system  Outcome: Progressing     Problem: Knowledge Deficit  Goal: Patient/family/caregiver demonstrates understanding of disease process, treatment plan, medications, and discharge instructions  Description: Complete learning assessment and assess knowledge base    Interventions:  - Provide teaching at level of understanding  - Provide teaching via preferred learning methods  Outcome: Progressing     Problem: CARDIOVASCULAR - ADULT  Goal: Maintains optimal cardiac output and hemodynamic stability  Description: INTERVENTIONS:  - Monitor I/O, vital signs and rhythm  - Monitor for S/S and trends of decreased cardiac output  - Administer and titrate ordered vasoactive medications to optimize hemodynamic stability  - Assess quality of pulses, skin color and temperature  - Assess for signs of decreased coronary artery perfusion  - Instruct patient to report change in severity of symptoms  Outcome: Progressing  Goal: Absence of cardiac dysrhythmias or at baseline rhythm  Description: INTERVENTIONS:  - Continuous cardiac monitoring, vital signs, obtain 12 lead EKG if ordered  - Administer antiarrhythmic and heart rate control medications as ordered  - Monitor electrolytes and administer replacement therapy as ordered  Outcome: Progressing     Problem: GASTROINTESTINAL - ADULT  Goal: Minimal or absence of nausea and/or vomiting  Description: INTERVENTIONS:  - Administer IV fluids if ordered to ensure adequate hydration  - Maintain NPO status until nausea and vomiting are resolved  - Nasogastric tube if ordered  - Administer ordered antiemetic medications as needed  - Provide nonpharmacologic comfort measures as appropriate  - Advance diet as tolerated, if ordered  - Consider nutrition services referral to assist patient with adequate nutrition and appropriate food choices  Outcome: Progressing  Goal: Maintains or returns to baseline bowel function  Description: INTERVENTIONS:  - Assess bowel function  - Encourage oral fluids to ensure adequate hydration  - Administer IV fluids if ordered to ensure adequate hydration  - Administer ordered medications as needed  - Encourage mobilization and activity  - Consider nutritional services referral to assist patient with adequate nutrition and appropriate food choices  Outcome: Progressing  Goal: Maintains adequate nutritional intake  Description: INTERVENTIONS:  - Monitor percentage of each meal consumed  - Identify factors contributing to decreased intake, treat as appropriate  - Assist with meals as needed  - Monitor I&O, weight, and lab values if indicated  - Obtain nutrition services referral as needed  Outcome: Progressing  Goal: Establish and maintain optimal ostomy function  Description: INTERVENTIONS:  - Assess bowel function  - Encourage oral fluids to ensure adequate hydration  - Administer IV fluids if ordered to ensure adequate hydration   - Administer ordered medications as needed  - Encourage mobilization and activity  - Nutrition services referral to assist patient with appropriate food choices  - Assess stoma site  - Consider wound care consult   Outcome: Progressing  Goal: Oral mucous membranes remain intact  Description: INTERVENTIONS  - Assess oral mucosa and hygiene practices  - Implement preventative oral hygiene regimen  - Implement oral medicated treatments as ordered  - Initiate Nutrition services referral as needed  Outcome: Progressing     Problem: METABOLIC, FLUID AND ELECTROLYTES - ADULT  Goal: Electrolytes maintained within normal limits  Description: INTERVENTIONS:  - Monitor labs and assess patient for signs and symptoms of electrolyte imbalances  - Administer electrolyte replacement as ordered  - Monitor response to electrolyte replacements, including repeat lab results as appropriate  - Instruct patient on fluid and nutrition as appropriate  Outcome: Progressing  Goal: Fluid balance maintained  Description: INTERVENTIONS:  - Monitor labs   - Monitor I/O and WT  - Instruct patient on fluid and nutrition as appropriate  - Assess for signs & symptoms of volume excess or deficit  Outcome: Progressing  Goal: Glucose maintained within target range  Description: INTERVENTIONS:  - Monitor Blood Glucose as ordered  - Assess for signs and symptoms of hyperglycemia and hypoglycemia  - Administer ordered medications to maintain glucose within target range  - Assess nutritional intake and initiate nutrition service referral as needed  Outcome: Progressing

## 2023-05-16 NOTE — ASSESSMENT & PLAN NOTE
· 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     · Follows with [facility] cardiology team    · ECHO performed in January 2023: EF 28-97%, normal diastolic function

## 2023-05-16 NOTE — ASSESSMENT & PLAN NOTE
· Presents from home due to nausea, vomiting and diarrhea over the past 2 days  Reports his son was sick with similar illness 1 week ago  However, son's symptoms lasted for less than 24 hours  · Potassium 3 4  · WBC 16 86 and Hg 17 1  Suspect hemoconcentration from dehydration  · QTc elevated in the ER  Tigan given  · IV fluids  · Suspect viral GI illness  Would hold off on starting antibiotics at this time  · Stool cultures  Patient still having diarrhea  · COVID/flu is negative  · Continue Tigan as needed    May utilize IV Benadryl if needed

## 2023-05-16 NOTE — ASSESSMENT & PLAN NOTE
· Presents from home due to nausea, vomiting and diarrhea over the past 2 days  Reports his son was sick with similar illness 1 week ago  However, son's symptoms lasted for less than 24 hours  · Potassium 3 4  · WBC 16 86 and Hg 17 1  Suspect hemoconcentration from dehydration  · QTc elevated in the ER  Tigan given  · IV fluids  · Suspect viral GI illness  Would hold off on starting antibiotics at this time  · Continue Tigan as needed    May utilize IV Benadryl if needed

## 2023-05-16 NOTE — ASSESSMENT & PLAN NOTE
· 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     · Follows with [facility] cardiology team    · ECHO performed in January 2023: EF 22-77%, normal diastolic function

## 2023-05-16 NOTE — PLAN OF CARE
Problem: PAIN - ADULT  Goal: Verbalizes/displays adequate comfort level or baseline comfort level  Description: Interventions:  - Encourage patient to monitor pain and request assistance  - Assess pain using appropriate pain scale  - Administer analgesics based on type and severity of pain and evaluate response  - Implement non-pharmacological measures as appropriate and evaluate response  - Consider cultural and social influences on pain and pain management  - Notify physician/advanced practitioner if interventions unsuccessful or patient reports new pain  Outcome: Progressing     Problem: INFECTION - ADULT  Goal: Absence or prevention of progression during hospitalization  Description: INTERVENTIONS:  - Assess and monitor for signs and symptoms of infection  - Monitor lab/diagnostic results  - Monitor all insertion sites, i e  indwelling lines, tubes, and drains  - Monitor endotracheal if appropriate and nasal secretions for changes in amount and color  - [place] appropriate cooling/warming therapies per order  - Administer medications as ordered  - Instruct and encourage patient and family to use good hand hygiene technique  - Identify and instruct in appropriate isolation precautions for identified infection/condition  Outcome: Progressing  Goal: Absence of fever/infection during neutropenic period  Description: INTERVENTIONS:  - Monitor WBC    Outcome: Progressing     Problem: SAFETY ADULT  Goal: Patient will remain free of falls  Description: INTERVENTIONS:  - Educate patient/family on patient safety including physical limitations  - Instruct patient to call for assistance with activity   - Consult OT/PT to assist with strengthening/mobility   - Keep Call bell within reach  - Keep bed low and locked with side rails adjusted as appropriate  - Keep care items and personal belongings within reach  - Initiate and maintain comfort rounds  - Make Fall Risk Sign visible to staff  - Offer Toileting every 2 Hours, in advance of need  - Initiate/Maintain alarm  - Obtain necessary fall risk management equipment: socks  - Apply yellow socks and bracelet for high fall risk patients  - Consider moving patient to room near nurses station  Outcome: Progressing  Goal: Maintain or return to baseline ADL function  Description: INTERVENTIONS:  -  Assess patient's ability to carry out ADLs; assess patient's baseline for ADL function and identify physical deficits which impact ability to perform ADLs (bathing, care of mouth/teeth, toileting, grooming, dressing, etc )  - Assess/evaluate cause of self-care deficits   - Assess range of motion  - Assess patient's mobility; develop plan if impaired  - Assess patient's need for assistive devices and provide as appropriate  - Encourage maximum independence but intervene and supervise when necessary  - Involve family in performance of ADLs  - Assess for home care needs following discharge   - Consider OT consult to assist with ADL evaluation and planning for discharge  - Provide patient education as appropriate  Outcome: Progressing  Goal: Maintains/Returns to pre admission functional level  Description: INTERVENTIONS:  - Perform BMAT or MOVE assessment daily    - Set and communicate daily mobility goal to care team and patient/family/caregiver  - Collaborate with rehabilitation services on mobility goals if consulted  - Perform Range of Motion 3 times a day  - Reposition patient every 3 hours    - Dangle patient 3 times a day  - Stand patient 3 times a day  - Ambulate patient 3 times a day  - Out of bed to chair 3 times a day   - Out of bed for meals 3 times a day  - Out of bed for toileting  - Record patient progress and toleration of activity level   Outcome: Progressing     Problem: DISCHARGE PLANNING  Goal: Discharge to home or other facility with appropriate resources  Description: INTERVENTIONS:  - Identify barriers to discharge w/patient and caregiver  - Arrange for needed discharge resources and transportation as appropriate  - Identify discharge learning needs (meds, wound care, etc )  - Arrange for interpretive services to assist at discharge as needed  - Refer to Case Management Department for coordinating discharge planning if the patient needs post-hospital services based on physician/advanced practitioner order or complex needs related to functional status, cognitive ability, or social support system  Outcome: Progressing     Problem: Knowledge Deficit  Goal: Patient/family/caregiver demonstrates understanding of disease process, treatment plan, medications, and discharge instructions  Description: Complete learning assessment and assess knowledge base    Interventions:  - Provide teaching at level of understanding  - Provide teaching via preferred learning methods  Outcome: Progressing     Problem: CARDIOVASCULAR - ADULT  Goal: Maintains optimal cardiac output and hemodynamic stability  Description: INTERVENTIONS:  - Monitor I/O, vital signs and rhythm  - Monitor for S/S and trends of decreased cardiac output  - Administer and titrate ordered vasoactive medications to optimize hemodynamic stability  - Assess quality of pulses, skin color and temperature  - Assess for signs of decreased coronary artery perfusion  - Instruct patient to report change in severity of symptoms  Outcome: Progressing  Goal: Absence of cardiac dysrhythmias or at baseline rhythm  Description: INTERVENTIONS:  - Continuous cardiac monitoring, vital signs, obtain 12 lead EKG if ordered  - Administer antiarrhythmic and heart rate control medications as ordered  - Monitor electrolytes and administer replacement therapy as ordered  Outcome: Progressing     Problem: GASTROINTESTINAL - ADULT  Goal: Minimal or absence of nausea and/or vomiting  Description: INTERVENTIONS:  - Administer IV fluids if ordered to ensure adequate hydration  - Maintain NPO status until nausea and vomiting are resolved  - Nasogastric tube if ordered  - Administer ordered antiemetic medications as needed  - Provide nonpharmacologic comfort measures as appropriate  - Advance diet as tolerated, if ordered  - Consider nutrition services referral to assist patient with adequate nutrition and appropriate food choices  Outcome: Progressing  Goal: Maintains or returns to baseline bowel function  Description: INTERVENTIONS:  - Assess bowel function  - Encourage oral fluids to ensure adequate hydration  - Administer IV fluids if ordered to ensure adequate hydration  - Administer ordered medications as needed  - Encourage mobilization and activity  - Consider nutritional services referral to assist patient with adequate nutrition and appropriate food choices  Outcome: Progressing  Goal: Maintains adequate nutritional intake  Description: INTERVENTIONS:  - Monitor percentage of each meal consumed  - Identify factors contributing to decreased intake, treat as appropriate  - Assist with meals as needed  - Monitor I&O, weight, and lab values if indicated  - Obtain nutrition services referral as needed  Outcome: Progressing  Goal: Establish and maintain optimal ostomy function  Description: INTERVENTIONS:  - Assess bowel function  - Encourage oral fluids to ensure adequate hydration  - Administer IV fluids if ordered to ensure adequate hydration   - Administer ordered medications as needed  - Encourage mobilization and activity  - Nutrition services referral to assist patient with appropriate food choices  - Assess stoma site  - Consider wound care consult   Outcome: Progressing  Goal: Oral mucous membranes remain intact  Description: INTERVENTIONS  - Assess oral mucosa and hygiene practices  - Implement preventative oral hygiene regimen  - Implement oral medicated treatments as ordered  - Initiate Nutrition services referral as needed  Outcome: Progressing     Problem: METABOLIC, FLUID AND ELECTROLYTES - ADULT  Goal: Electrolytes maintained within normal limits  Description: INTERVENTIONS:  - Monitor labs and assess patient for signs and symptoms of electrolyte imbalances  - Administer electrolyte replacement as ordered  - Monitor response to electrolyte replacements, including repeat lab results as appropriate  - Instruct patient on fluid and nutrition as appropriate  Outcome: Progressing  Goal: Fluid balance maintained  Description: INTERVENTIONS:  - Monitor labs   - Monitor I/O and WT  - Instruct patient on fluid and nutrition as appropriate  - Assess for signs & symptoms of volume excess or deficit  Outcome: Progressing  Goal: Glucose maintained within target range  Description: INTERVENTIONS:  - Monitor Blood Glucose as ordered  - Assess for signs and symptoms of hyperglycemia and hypoglycemia  - Administer ordered medications to maintain glucose within target range  - Assess nutritional intake and initiate nutrition service referral as needed  Outcome: Progressing

## 2023-05-16 NOTE — ASSESSMENT & PLAN NOTE
· N/V/D x 2 days  Diarrhea has improved  Continues to have nausea and vomiting   · SIRS criteria:   · Tachycardia, fever, leukocytosis  · Lactic and procal ordered    · SOI: Suspected viral GI illness   · ABx: None  Monitor off abx   · Stool studies pending  · IV fluids  Continue to monitor labs  If WBC does not improve despite IV fluids consider initiating abx

## 2023-05-16 NOTE — CASE MANAGEMENT
Case Management Discharge Planning Note    Patient name Duy Salcido  Location [address] [id number] MRN [id number]  : 1985 Date 2023       Current Admission Date: 5/15/2023  Current Admission Diagnosis:Partial anomalous pulmonary venous return (PAPVR)   Patient Active Problem List    Diagnosis Date Noted   • Partial anomalous pulmonary venous return (PAPVR) 05/15/2023   • GERD (gastroesophageal reflux disease) 05/15/2023   • Nausea vomiting and diarrhea 05/15/2023   • Elevated creatine kinase 05/15/2023   • Viral sepsis ([address] ) 05/15/2023   • Prolonged Q-T interval on ECG 05/15/2023   • Anxiety and depression 05/15/2023   • Nicotine abuse 05/15/2023      LOS (days): 0  Geometric Mean LOS (GMLOS) (days):   Days to GMLOS:     OBJECTIVE:        Current admission status: Observation   Preferred Pharmacy:   [id number], [address] [address]  Phone: [phone] Fax: [phone]    Primary Care Provider: Aquiles Duran MD    Primary Insurance: BLUE CROSS  Secondary Insurance:     DISCHARGE DETAILS:    Additional Comments: CM screen  Per Pt's nurse(Yaz), Pt is from home with wife and son  Pt is independent and plan to return home upon discharge

## 2023-05-16 NOTE — PROGRESS NOTES
[place]  Progress Note  Name: Paul Egan  MRN: [id number]  Unit/Bed#: -01 I Date of Admission: 5/15/2023   Date of Service: 5/16/2023 I Hospital Day: 0    Assessment/Plan   Nicotine abuse  Assessment & Plan  · Vapes nicotine daily   · Encourage cessation     Anxiety and depression  Assessment & Plan  · Continue home lexapro and wellbutrin     Prolonged Q-T interval on ECG  Assessment & Plan  · QT//627 on EKG in the ER   · Prior /421 (04/2022)   · Suspected prolongation due to Nausea, vomiting, diarrhea causing dehydration   · Administer potassium and IV magnesium   · IV fluids   · Telemetry   · Repeat EKG this morning  · Cardiology consult     Viral sepsis ([facility] [address] )  Assessment & Plan  · N/V/D x 2 days  Diarrhea has improved  Continues to have nausea and vomiting   · SIRS criteria:   · Tachycardia, fever, leukocytosis  · Lactic and procal ordered    · SOI: Suspected viral GI illness   · ABx: None  Monitor off abx   · Stool studies pending  · IV fluids  Continue to monitor labs  If WBC does not improve despite IV fluids consider initiating abx  Elevated serum creatinine  Assessment & Plan  · Cr 1 38  · Baseline 1-1 2  · Does not meet JOSE ALEJANDRO criteria   · Suspect elevation in setting of nausea, vomiting, diarrhea  · Receiving IV fluids  · Resolved  · Trend BMP    Nausea vomiting and diarrhea  Assessment & Plan  · Presents from home due to nausea, vomiting and diarrhea over the past 2 days  Reports his son was sick with similar illness 1 week ago  However, son's symptoms lasted for less than 24 hours  · Potassium 3 4  · WBC 16 86 and Hg 17 1  Suspect hemoconcentration from dehydration  · QTc elevated in the ER  Tigan given  · IV fluids  · Suspect viral GI illness  Would hold off on starting antibiotics at this time  · Stool cultures  Patient still having diarrhea  · COVID/flu is negative  · Continue Tigan as needed    May utilize IV Benadryl if "needed    GERD (gastroesophageal reflux disease)  Assessment & Plan  · Takes Prilosec outpatient   · IV PPI while inpatient    Partial anomalous pulmonary venous return (PAPVR)  Assessment & Plan  · 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  · Follows with [facility] cardiology team    · ECHO performed in January 2023: EF 51-64%, normal diastolic function          Labs & Imaging: I have personally reviewed pertinent reports  VTE Prophylaxis: in place  Code Status:   Level 1 - Full Code    Patient Centered Rounds: I have performed bedside rounds with nursing staff today  Discussions with Specialists or Other Care Team Provider: Cards    Education and Discussions with Family / Patient: Spouse Denae    Current Length of Stay: 0 day(s)    Current Patient Status: Observation   Certification Statement: The patient will continue to require additional inpatient hospital stay due to see my assessment and plan  Subjective:   Patient is seen and examined at bedside  No new complaints  Afebrile  All other ROS are negative  Objective:    Vitals: Blood pressure 100/65, pulse 98, temperature 98 °F (36 7 °C), resp  rate 16, height 5' 7\" (1 702 m), weight 81 3 kg (179 lb 3 7 oz), SpO2 94 %  ,Body mass index is 28 07 kg/m²  SPO2 RA Rest    Flowsheet Row ED to Hosp-Admission (Current) from 5/15/2023 in [address] Med Surg Unit   SpO2 94 %   SpO2 Activity At Rest   O2 Device None (Room air)   O2 Flow Rate --        I&O:     Intake/Output Summary (Last 24 hours) at 5/16/2023 1211  Last data filed at 5/15/2023 1930  Gross per 24 hour   Intake 1000 ml   Output --   Net 1000 ml       Physical Exam:    General- Alert, lying comfortably in bed  Not in any acute distress  Neck- Supple, No JVD  CVS- regular, S1 and S2 normal  Chest- Bilateral Air entry, No rhochi, crackles or wheezing present    Abdomen- soft, nontender, not distended, no guarding or rigidity, BS+  Extremities-  No pedal edema, No calf " tenderness  CNS-   Alert, awake and orientedx3  No focal deficits present  Invasive Devices     Peripheral Intravenous Line  Duration           Peripheral IV 05/15/23 Right Antecubital <1 day                      Social History  reviewed  History reviewed  No pertinent family history   reviewed    Meds:  Current Facility-Administered Medications   Medication Dose Route Frequency Provider Last Rate Last Admin   • acetaminophen (TYLENOL) tablet 650 mg  650 mg Oral Q6H PRN Pam Theodore PA-C   650 mg at 05/16/23 0032   • buPROPion (WELLBUTRIN XL) 24 hr tablet 150 mg  150 mg Oral HS Stacy Hernandez PA-C       • escitalopram (LEXAPRO) tablet 20 mg  20 mg Oral HS Stacy Hernandez PA-C       • ibuprofen (MOTRIN) tablet 600 mg  600 mg Oral Q6H PRN Pam Theodore PA-C   600 mg at 05/16/23 0209   • pantoprazole (PROTONIX) injection 40 mg  40 mg Intravenous Q24H [address] Stacy Hernandez PA-C   40 mg at 05/16/23 4931   • sodium chloride 0 9 % infusion  100 mL/hr Intravenous Continuous Stacy Hernandez PA-C 100 mL/hr at 05/16/23 0932 100 mL/hr at 05/16/23 0932   • trimethobenzamide (TIGAN) IM injection 100 mg  100 mg Intramuscular Q6H PRN Pam Theodore PA-C          Medications Prior to Admission   Medication   • buPROPion (WELLBUTRIN XL) 150 mg 24 hr tablet   • escitalopram (LEXAPRO) 20 mg tablet       Labs:  Results from last 7 days   Lab Units 05/16/23  1057 05/15/23  1809   WBC Thousand/uL 9 14 16 86*   HEMOGLOBIN g/dL 14 4 17 1*   HEMATOCRIT % 43 9 50 8*   PLATELETS Thousands/uL 159 252   NEUTROS PCT % 83* 85*   LYMPHS PCT % 5* 5*   MONOS PCT % 12 10   EOS PCT % 0 0     Results from last 7 days   Lab Units 05/16/23  1057 05/16/23  0038 05/15/23  1809   POTASSIUM mmol/L 4 0 3 7 3 4*   CHLORIDE mmol/L 105 102 96   CO2 mmol/L 25 24 26   BUN mg/dL 19 22 30*   CREATININE mg/dL 1 10 1 18 1 38*   CALCIUM mg/dL 8 0* 7 9* 9 3   ALK PHOS U/L  --   --  63   ALT U/L  --   --  48   AST U/L  --   --  21     No results found for: TROPONINI, CKMB, CKTOTAL      No results found for: Celestine Moore      Imaging:  Results for orders placed during the hospital encounter of 04/10/22    X-ray chest 1 view portable    Narrative  CHEST    INDICATION:   chest pain  COMPARISON:  None    EXAM PERFORMED/VIEWS:  XR CHEST PORTABLE  Single view    FINDINGS:    Cardiomediastinal silhouette appears unremarkable  The lungs are clear  No pneumothorax or pleural effusion  Osseous structures appear within normal limits for patient age  Impression  No acute cardiopulmonary disease  Workstation performed: [id number]    No results found for this or any previous visit  Last 24 Hours Medication List:   Current Facility-Administered Medications   Medication Dose Route Frequency Provider Last Rate   • acetaminophen  650 mg Oral Q6H PRN Tanvir Gottlieb PA-C     • buPROPion  150 mg Oral HS Tanvir Gottlieb PA-C     • escitalopram  20 mg Oral HS Stacy Hernandez PA-C     • ibuprofen  600 mg Oral Q6H PRN Tanvir Gottlieb PA-C     • pantoprazole  40 mg Intravenous Q24H [address] Stacy Hernandez PA-C     • sodium chloride  100 mL/hr Intravenous Continuous Stacy Hernandez PA-C 100 mL/hr (05/16/23 0932)   • trimethobenzamide  100 mg Intramuscular Q6H PRN Tavnir Gottlieb PA-C          Today, Patient Was Seen By: Olivia Farley MD    ** Please Note: Dictation voice to text software may have been used in the creation of this document   **

## 2023-05-16 NOTE — UTILIZATION REVIEW
Initial Clinical Review    Admission: Date/Time/Statement: 5/15/23 2058 observation AND CHANGED 5/17/23 1350 INPATIENT RE: PATIENT IN OBSERVATION > 40 HOURS AND HAS ONGOING DIARRHEA & FEBRILE  WITH PENDING STOOL CULTURES,CONTINUE IVF  Admission Orders (From admission, onward)     Ordered        05/17/23 1350  Inpatient Admission  Once                      Orders Placed This Encounter   Procedures   • Inpatient Admission     Standing Status:   Standing     Number of Occurrences:   1     Order Specific Question:   Level of Care     Answer:   Med Surg [16]     Order Specific Question:   Estimated length of stay     Answer:   More than 2 Midnights     Order Specific Question:   Certification     Answer:   I certify that inpatient services are medically necessary for this patient for a duration of greater than two midnights  See H&P and MD Progress Notes for additional information about the patient's course of treatment  ED Arrival Information     Expected   -    Arrival   5/15/2023 17:31    Acuity   Urgent            Means of arrival   Walk-In    Escorted by   Family Member    Service   Hospitalist    Admission type   Emergency            Arrival complaint   vomiting  diarrhea           Chief Complaint   Patient presents with   • Vomiting     Pt to ED from home c/o vomiting and diarrhea x 2 days  No sick contacts, states he had wings from [facility] a few days ago and then got sick  Wife had same wings but she is not vomiting  Pt states he is barely able to keep anything down, forcing fluids but feels very nauseous       Initial Presentation: 42 y o  male from home to ED admitted to observation [address]  due to nausea, vomiting and diarrhea/prolonged Qt/Viral sepsis/elevated creatinine  Presented due to nausea, vomiting and diarrhea starting about 48 hours prior to arrival   Unable to tolerate po  Increased fatigue  On exam tachycardic  Na 134  K 34    Bun 30,creatinine 1 38 with basline of 1 - 1 2     Wbc 16 86   H&H 17 1/50 8  Ecg prolonged qT of 586, second ecg 617  In the ED given 2 liters of IVF and Zofran, remained tachycardic and nauseous  Active vomiting  Given Tigan  Plan is continued IVF, tigan as needed  Telemetry  Ecg in am   Cardiology consult  Check stool studies  5/16/23 observation:  Still with diarrhea  Exam non focal   Na 133, K 3 7  Continue telemetry, IVF  Repeat ecg  Stool studies pending  If WBC does not improve despite IV fluids consider initiating abx    Per Cardiology 5/16/23:  Patient acquired QT syndrome and suspect due to electrolyte depletion due to viral GI illness  Continue daily ecg and telemetry  correct any electrolyte disturbance for goal K>4 and Mg>2  Continue IVF  Consider psyche evaluation as both Lexapro and Wellbutrin as risk of QT prolongation  5/17/23 CHANGED TO INPATIENT:  Last evening febrile to 102 8  Still with diarrhea  Generalized weakness  On exam:  Telemetry sinus  Tachycardic at times  K 3 4  Stool C diff and enteric panel in process  Plan is replete K, continue IV Protonix  Continue IVF and monitor off antibiotics  Check ct abdomen     5/18/23: afebrile  Remains tachycardic at times  Diet tolerated  On exam still some generalized weakness but increased strength since admission  K 3 4    Replete K     ED Triage Vitals   Temperature Pulse Respirations Blood Pressure SpO2   05/15/23 1756 05/15/23 1756 05/15/23 1756 05/15/23 1756 05/15/23 1756   98 °F (36 7 °C) (!) 130 18 137/94 99 %      Temp Source Heart Rate Source Patient Position - Orthostatic VS BP Location FiO2 (%)   05/16/23 0540 05/15/23 1830 05/15/23 1756 05/15/23 1756 --   Oral Monitor Sitting Left arm       Pain Score       05/15/23 1756       9          Wt Readings from Last 1 Encounters:   05/18/23 76 8 kg (169 lb 6 4 oz)     Additional Vital Signs:   05/18/23 08:07:57 99 2 °F (37 3 °C) 81 -- 125/76 92 96 % --   05/17/23 20:48:27 98 3 °F (36 8 °C) 80 16 130/73 92 96 % --   05/17/23 18:00:55 98 7 °F (37 1 °C) 90 16 124/78 93 97 % --   05/17/23 08:03:49 98 9 °F (37 2 °C) 89 -- 126/80 95 95 % --   05/17/23 04:33:47 99 5 °F (37 5 °C) 90 16 131/81 98 95 %      05/17/23 04:33:47 99 5 °F (37 5 °C) 90 16 131/81 98 95 % -- --   05/17/23 0100 -- 102 -- -- -- 96 % -- --   05/16/23 2230 -- -- -- -- -- 94 % -- --   05/16/23 20:40:33 98 4 °F (36 9 °C) -- 16 135/87 103 -- -- --   05/16/23 19:05:06 102 4 °F (39 1 °C) Abnormal  120 Abnormal  22 139/91 107 94 % -- --   05/16/23 1655 102 8 °F (39 3 °C) Abnormal  125 Abnormal  -- -- -- -- -- --   05/16/23 15:21:50 100 9 °F (38 3 °C) Abnormal  120 Abnormal  18 136/93 107 94 %       05/16/23 06:32:56 98 °F (36 7 °C) 98 16 100/65 77 94 % None (Room air) --   05/16/23 05:40:42 98 3 °F (36 8 °C) 105 -- -- -- 94 % -- --   05/16/23 03:20:45 100 8 °F (38 2 °C) Abnormal  119 Abnormal  -- -- -- 94 % -- --   05/16/23 01:56:12 100 9 °F (38 3 °C) Abnormal  126 Abnormal  18 117/78 91 93 % -- --   05/16/23 00:30:38 101 7 °F (38 7 °C) Abnormal  124 Abnormal  -- -- -- 93 % -- --   05/15/23 22:29:29 102 9 °F (39 4 °C) Abnormal  131 Abnormal  -- 128/82 97 96 % None (Room air) --   05/15/23 2000 -- 122 Abnormal  22 134/79 100 98 % None (Room air) Sitting   05/15/23 1830 -- 130 Abnormal  22 130/80 -- 95 % None (Room air) Sitting     Pertinent Labs/Diagnostic Test Results:  No imaging   CT abdomen pelvis w contrast   Final Result by Vince Wallace MD (05/17 1415)      Liquid stool in the right hemicolon, compatible with known diarrheal illness  Otherwise, no acute abnormality in the abdomen or pelvis  Workstation performed: [id number]         XR chest portable   Final Result by Ariel Ramirez DO (05/17 2786)      No acute cardiopulmonary disease                    Workstation performed: [id number]           5/15/23 2017 ecg Interpretation: non-specific     Rate:     ECG rate:  132     ECG rate assessment: tachycardic     Rhythm:   Rhythm: sinus tachycardia     Ectopy:     Ectopy: none     QRS:     QRS axis:  Normal     QRS intervals:  Normal   Conduction:     Conduction: normal     ST segments:     ST segments:  Non-specific   T waves:     T waves: non-specific     Comments:      qtc 586    5/15/23 2142 ecg Interpretation: non-specific     Rate:     ECG rate:  118     ECG rate assessment: tachycardic     Rhythm:     Rhythm: sinus tachycardia     Ectopy:     Ectopy: none     QRS:     QRS axis:  Normal     QRS intervals:  Normal   Conduction:     Conduction: normal     ST segments:     ST segments:  Non-specific   T waves:     T waves: non-specific     Other findings:     Other findings: prolonged qTc interval     Comments:      qtc 627    Results from last 7 days   Lab Units 05/17/23 2049 05/17/23 0427 05/16/23  1057 05/15/23  1809   WBC Thousand/uL 3 92* 4 75 9 14 16 86*   HEMOGLOBIN g/dL 12 9 13 3 14 4 17 1*   HEMATOCRIT % 38 7 39 9 43 9 50 8*   PLATELETS Thousands/uL 149 138* 159 252   NEUTROS ABS Thousands/µL 1 87 3 00 7 54 14 23*     Results from last 7 days   Lab Units 05/18/23 0444 05/17/23 0427 05/16/23  1057 05/16/23  0038 05/15/23  1809   SODIUM mmol/L 136 136 135 133* 134*   POTASSIUM mmol/L 3 4* 3 4* 4 0 3 7 3 4*   CHLORIDE mmol/L 105 107 105 102 96   CO2 mmol/L 27 24 25 24 26   ANION GAP mmol/L 4 5 5 7 12   BUN mg/dL 8 11 19 22 30*   CREATININE mg/dL 0 86 0 80 1 10 1 18 1 38*   EGFR ml/min/1 73sq m 109 112 84 77 63   CALCIUM mg/dL 8 5 7 8* 8 0* 7 9* 9 3   MAGNESIUM mg/dL  --  2 2  --   --   --      Results from last 7 days   Lab Units 05/17/23  0427 05/15/23  1809   AST U/L 32 21   ALT U/L 36 48   ALK PHOS U/L 46 63   TOTAL PROTEIN g/dL 6 0* 8 5*   ALBUMIN g/dL 3 4* 4 9   TOTAL BILIRUBIN mg/dL 0 79 1 11*     Results from last 7 days   Lab Units 05/18/23 0444 05/17/23 0427 05/16/23  1057 05/16/23  0038 05/15/23  1809   GLUCOSE RANDOM mg/dL 94 99 94 119 113     Results from last 7 days   Lab Units 05/15/23  7128 05/15/23  1907   HS TNI 0HR ng/L  --  3   HS TNI 2HR ng/L 5  --    HSTNI D2 ng/L 2  --      Results from last 7 days   Lab Units 05/15/23  1907   D-DIMER QUANTITATIVE ug/ml FEU 0 32     Results from last 7 days   Lab Units 05/17/23  0427 05/16/23  0038   PROCALCITONIN ng/ml 0 24 0 18     Results from last 7 days   Lab Units 05/16/23  0038   LACTIC ACID mmol/L 1 0     Results from last 7 days   Lab Units 05/15/23  1809   LIPASE u/L 19       ED Treatment:   Medication Administration from 05/15/2023 1731 to 05/15/2023 2226       Date/Time Order Dose Route Action Comments     05/15/2023 1809 EDT sodium chloride 0 9 % bolus 1,000 mL 1,000 mL Intravenous New Bag --     05/15/2023 1810 EDT ondansetron (ZOFRAN) injection 4 mg 4 mg Intravenous Given --     05/15/2023 1907 EDT trimethobenzamide (TIGAN) IM injection 200 mg 200 mg Intramuscular Given right gluteal     05/15/2023 2000 EDT sodium chloride 0 9 % bolus 1,000 mL 1,000 mL Intravenous New Bag --        Past Medical History:   Diagnosis Date   • ADHD    • Anxiety      Present on Admission:  **None**      Admitting Diagnosis: Vomiting [R11 10]  Tachycardia [R00 0]  Prolonged Q-T interval on ECG [R94 31]  Nausea vomiting and diarrhea [R11 2, R19 7]  Age/Sex: 42 y o  male  Admission Orders:  Scheduled Medications:  buPROPion, 150 mg, Oral, HS  escitalopram, 20 mg, Oral, HS  pantoprazole, 40 mg, Intravenous, Q24H NATY    magnesium sulfate IVPB (premix) SOLN 1 g  Dose: 1 g  Freq: Once Route: IV  Start: 05/15/23 2345 End: 05/16/23 0054    potassium chloride (K-DUR,KLOR-CON) CR tablet 40 mEq  Dose: 40 mEq  Freq: Once Route: PO  Start: 05/15/23 2345 End: 05/15/23 2354    potassium chloride (K-DUR,KLOR-CON) CR tablet 40 mEq  Dose: 40 mEq  Freq: Once Route: PO  Start: 05/17/23 0800 End: 05/17/23 0822    potassium chloride (K-DUR,KLOR-CON) CR tablet 40 mEq  Dose: 40 mEq  Freq:  Once Route: PO  Start: 05/18/23 0730 End: 05/18/23 0840    Continuous IV Infusions:  sodium chloride, 100 mL/hr, Intravenous, Continuous      PRN Meds:  acetaminophen, 650 mg, Oral, Q6H PRN x 2 5/16  ibuprofen, 600 mg, Oral, Q6H PRN x 2 5/16, x 1 5/17  trimethobenzamide, 100 mg, Intramuscular, Q6H PRN x 1 5/16    Telemetry   cpap at bedtime     IP CONSULT TO CARDIOLOGY    Network Utilization Review Department  ATTENTION: Please call with any questions or concerns to [phone] and carefully listen to the prompts so that you are directed to the right person  All voicemails are confidential   Michelle Nash all requests for admission clinical reviews, approved or denied determinations and any other requests to dedicated fax number below belonging to the campus where the patient is receiving treatment   List of dedicated fax numbers for the Facilities:  [address] DENIALS (Administrative/Medical Necessity) [phone]   [address] (Maternity/NICU/Pediatrics) [phone]   [address] Nickie Lockett [phone]   [facility]  [phone]   [address] [address] [address] [place] [address] Torie Garber [address] [phone]   [address] [address] [facility] [place] [address] [phone]

## 2023-05-16 NOTE — ASSESSMENT & PLAN NOTE
· Cr 1 38  · Baseline 1-1 2  · Does not meet JOSE ALEJANDRO criteria   · Suspect elevation in setting of nausea, vomiting, diarrhea  · Receiving IV fluids  · Continue to monitor

## 2023-05-16 NOTE — ASSESSMENT & PLAN NOTE
· QT//627 on EKG in the ER   · Prior /421 (04/2022)   · Suspected prolongation due to Nausea, vomiting, diarrhea causing dehydration   · Administer potassium and IV magnesium   · IV fluids   · Telemetry   · Recheck EKG with morning labs   · Consider cardiology consult if continues to be prolonged

## 2023-05-16 NOTE — H&P
[facility]  H&P  Name: Mariam Rivers 42 y o  male I MRN: [id number]  Unit/Bed#: -01 I Date of Admission: 5/15/2023   Date of Service: 5/15/2023 I Hospital Day: 0      Assessment/Plan   Nausea vomiting and diarrhea  Assessment & Plan  · Presents from home due to nausea, vomiting and diarrhea over the past 2 days  Reports his son was sick with similar illness 1 week ago  However, son's symptoms lasted for less than 24 hours  · Potassium 3 4  · WBC 16 86 and Hg 17 1  Suspect hemoconcentration from dehydration  · QTc elevated in the ER  Tigan given  · IV fluids  · Suspect viral GI illness  Would hold off on starting antibiotics at this time  · Continue Tigan as needed  May utilize IV Benadryl if needed    Prolonged Q-T interval on ECG  Assessment & Plan  · QT//627 on EKG in the ER   · Prior /421 (04/2022)   · Suspected prolongation due to Nausea, vomiting, diarrhea causing dehydration   · Administer potassium and IV magnesium   · IV fluids   · Telemetry   · Recheck EKG with morning labs   · Consider cardiology consult if continues to be prolonged  Viral sepsis ([facility] [address] )  Assessment & Plan  · N/V/D x 2 days  Diarrhea has improved  Continues to have nausea and vomiting   · SIRS criteria:   · Tachycardia, fever, leukocytosis  · Lactic and procal ordered    · SOI: Suspected viral GI illness   · ABx: None  Monitor off abx   · Stool studies ordered  · IV fluids  Continue to monitor labs  If WBC does not improve despite IV fluids consider initiating abx       Elevated creatine kinase  Assessment & Plan  · Cr 1 38  · Baseline 1-1 2  · Does not meet JOSE ALEJANDRO criteria   · Suspect elevation in setting of nausea, vomiting, diarrhea  · Receiving IV fluids  · Continue to monitor    Nicotine abuse  Assessment & Plan  · Vapes nicotine daily   · Encourage cessation     Anxiety and depression  Assessment & Plan  · Continue home lexapro and wellbutrin     GERD (gastroesophageal reflux disease)  Assessment & Plan  · Takes Prilosec outpatient   · IV PPI while inpatient    Partial anomalous pulmonary venous return (PAPVR)  Assessment & Plan  · 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 cardiology [facility]    · ECHO performed in January 2023: EF 00-93%, normal diastolic function        VTE Pharmacologic Prophylaxis: VTE Score: 2 Low Risk (Score 0-2) - Encourage Ambulation  Code Status: Level 1 - Full Code   Discussion with family: Updated  (Mother ) at bedside  Anticipated Length of Stay: Patient will be admitted on an observation basis with an anticipated length of stay of less than 2 midnights secondary to N,V,D  Total Time Spent on Date of Encounter in care of patient: 65 minutes This time was spent on one or more of the following: performing physical exam; counseling and coordination of care; obtaining or reviewing history; documenting in the medical record; reviewing/ordering tests, medications or procedures; communicating with other healthcare professionals and discussing with patient's family/caregivers  Chief Complaint: Vomiting     History of Present Illness:  Myra Gardiner is a 42 y o  male with a PMH of PAPVR, anxiety, depression, tobacco abuse who presents from home due to nausea, vomiting and diarrhea over the past 2 days  Diarrhea has lessened but continues to have nausea and vomiting whenever he has any oral intake  Denies abdominal pain  Meeting SIRS criteria due to tachycardia, leukocytosis and fever  Patient reports that his son had similar illness 1 week ago but symptoms only lasted for approximately 24 hours  Denies known fevers at home  Vapes nicotine daily  Denies alcohol abuse  Review of Systems:  Review of Systems   Constitutional: Negative for fatigue and fever  HENT: Negative for sore throat  Respiratory: Negative for cough, chest tightness and shortness of breath      Cardiovascular: Negative for "chest pain  Gastrointestinal: Positive for diarrhea, nausea and vomiting  Negative for abdominal distention and abdominal pain  Genitourinary: Negative for difficulty urinating  Musculoskeletal: Negative for arthralgias  Neurological: Negative for weakness and headaches  Psychiatric/Behavioral: Negative for agitation and behavioral problems  All other systems reviewed and are negative  Past Medical and Surgical History:   Past Medical History:   Diagnosis Date   • ADHD    • Anxiety        Past Surgical History:   Procedure Laterality Date   • ROOT CANAL         Meds/Allergies:  Prior to Admission medications    Medication Sig Start Date End Date Taking? Authorizing Provider   buPROPion (WELLBUTRIN XL) 150 mg 24 hr tablet Take 150 mg by mouth daily at bedtime   Yes Historical Provider, MD   escitalopram (LEXAPRO) 20 mg tablet Take 20 mg by mouth daily at bedtime   Yes Historical Provider, MD   chlorhexidine (PERIDEX) 0 12 % solution Apply 15 mL to the mouth or throat 2 (two) times a day 11/17/21 5/15/23  Larissa Pearson MD     I have reviewed home medications with patient personally  Allergies: No Known Allergies    Social History:  Marital Status: /Civil Union   Occupation: Unknown  Patient Pre-hospital Living Situation: Home  Patient Pre-hospital Level of Mobility: walks  Patient Pre-hospital Diet Restrictions: Regular  Substance Use History:   Social History     Substance and Sexual Activity   Alcohol Use Yes    Comment: socially      Social History     Tobacco Use   Smoking Status Former   Smokeless Tobacco Never     Social History     Substance and Sexual Activity   Drug Use Never       Family History:  History reviewed  No pertinent family history      Physical Exam:     Vitals:   Blood Pressure: 128/82 (05/15/23 2229)  Pulse: (!) 131 (05/15/23 2229)  Temperature: (!) 102 9 °F (39 4 °C) (05/15/23 2229)  Respirations: 20 (05/15/23 2030)  Height: 5' 7\" (170 2 cm) (05/15/23 " 2030)  Weight - Scale: 77 1 kg (170 lb) (05/15/23 1756)  SpO2: 96 % (05/15/23 2229)    Physical Exam  Vitals and nursing note reviewed  Constitutional:       Appearance: Normal appearance  HENT:      Head: Normocephalic  Eyes:      Extraocular Movements: Extraocular movements intact  Pupils: Pupils are equal, round, and reactive to light  Cardiovascular:      Rate and Rhythm: Regular rhythm  Tachycardia present  Heart sounds: No murmur heard  No gallop  Pulmonary:      Effort: No respiratory distress  Breath sounds: Normal breath sounds  No wheezing  Abdominal:      General: Bowel sounds are normal  There is no distension  Tenderness: There is no abdominal tenderness  Musculoskeletal:         General: Normal range of motion  Cervical back: Normal range of motion  Right lower leg: No edema  Left lower leg: No edema  Skin:     General: Skin is warm  Neurological:      General: No focal deficit present  Mental Status: He is alert and oriented to person, place, and time  Mental status is at baseline  Psychiatric:         Mood and Affect: Mood normal          Behavior: Behavior normal          Thought Content:  Thought content normal           Additional Data:     Lab Results:  Results from last 7 days   Lab Units 05/15/23  1809   WBC Thousand/uL 16 86*   HEMOGLOBIN g/dL 17 1*   HEMATOCRIT % 50 8*   PLATELETS Thousands/uL 252   NEUTROS PCT % 85*   LYMPHS PCT % 5*   MONOS PCT % 10   EOS PCT % 0     Results from last 7 days   Lab Units 05/15/23  1809   SODIUM mmol/L 134*   POTASSIUM mmol/L 3 4*   CHLORIDE mmol/L 96   CO2 mmol/L 26   BUN mg/dL 30*   CREATININE mg/dL 1 38*   ANION GAP mmol/L 12   CALCIUM mg/dL 9 3   ALBUMIN g/dL 4 9   TOTAL BILIRUBIN mg/dL 1 11*   ALK PHOS U/L 63   ALT U/L 48   AST U/L 21   GLUCOSE RANDOM mg/dL 113                       Lines/Drains:  Invasive Devices     Peripheral Intravenous Line  Duration           Peripheral IV 05/15/23 Right Antecubital <1 day                     Imaging: No pertinent imaging reviewed  No orders to display       EKG and Other Studies Reviewed on Admission:   · EKG: Sinus Tachycardia    ** Please Note: This note has been constructed using a voice recognition system   **

## 2023-05-16 NOTE — ASSESSMENT & PLAN NOTE
· QT//627 on EKG in the ER   · Prior /421 (04/2022)   · Suspected prolongation due to Nausea, vomiting, diarrhea causing dehydration   · Administer potassium and IV magnesium   · IV fluids   · Telemetry   · Repeat EKG this morning  · Cardiology consult

## 2023-05-16 NOTE — ASSESSMENT & PLAN NOTE
· Cr 1 38  · Baseline 1-1 2  · Does not meet JOSE ALEJANDRO criteria   · Suspect elevation in setting of nausea, vomiting, diarrhea  · Receiving IV fluids  · Resolved  · Trend BMP

## 2023-05-16 NOTE — CONSULTS
Consult - Cardiology   Jacquelin Verdin 42 y o  male MRN: [id number]  Unit/Bed#: -01 Encounter: [id number]    Reason For Consult: Prolonged QT   Outpatient Cardiologist: None             ASSESSMENT:  1  Acquired QT syndrome (reason for consult)  a  Likely secondary to electrolyte derangement due to viral GI illness  b  EKG 5/16/2023: ST, Qtc 437ms  c  EKG 5/15/2023: ST, Qtc 434ms  d  EKG 5/15/2023: ST, Qtc 627ms  e  Prior EKG 4/10/2022: NSR, Qtc 421ms  2  Nausea/vomiting/diarrhea (chief complaint)   a  Suspected secondary to viral GE  b  Presented with N/V/D x 48 hours prior to admission  c  Admission BMP: , K3 4, BUN 30, Cr 1 38  d  S/P IV Zofran 4 mg x 1 in the ED  e  CT abdomen: scattered air-fluid levels within the colon may correspond with diarrheal illness  3  Anxiety/depression  a  On Lexapro and Wellbutrin  4  Partial anomalous pulmonary venous return  a  Known hx; follows with [facility] cardiology  b  Prior TTE 1/2023: EF 56-51%, normal diastolic function    PLAN/ DISCUSSION:     · QT prolongation is most likely due to electrolyte imbalance which is now corrected, EKG this AM -- 437ms  Continue daily EKGs and telemetry as inpatient  Prior TTE from 1/2023 reviewed, EF preserved, no evidence of structural heart disease  · Continue to replenish IVF and would correct any electrolyte disturbance for goal K>4 and Mg>2  · Avoid any PRN QT prolonging medications such as Zofran given in the ED for nausea; would recommend scopolamine patches (unless otherwise contraindicated) which carries the least QT prolongation among antiemetics if needed  · As a last step, we can consider psych evaluation for recommendation regarding switching or discontinuation of his Lexapro and Wellbutrin which both carry risk of QT prolongation  However, I am less inclined to make changes to this regimen as he has been stable on these medications and did not have any prior QT prolongation on his prior EKGs from this admission  · Recommend 1 week nursing visit for EKG for QTc check with his primary cardiologist office  Informed patient if he has any issues with scheduling he may schedule with our office post-discharge  History of Present Illness:  Jayna Herrera is a 42y o  year old male with PMH as above who presents to [facility] [address] with episodes of abdominal cramping/ discomfort, nausea with vomiting and diarrhea over the past 48 hours  Reports his 6year old son had similar issues before he developed symptoms  Unable to keep anything down  He reported to the ED for evaluation  BMP showed mild JOSE ALEJANDRO and hypokalemia  Mild leukocytosis  CT abdomen consistent with diarrheal illness  He is status post IV Zofran 4 mg x 1 in the ED  He underwent EKG which showed prolonged QTc of approx 627ms with repeat EKG confirming the same  He was admitted overnight for observation for IVF and electrolyte replenishment as well as telemetry monitoring  Cardiology is consulted for recommendations regarding QTc prolongation  On repeat EKGs it appears his QTc had normalized now in the 400 range within expected limits  He denies any cardiac symptoms to include chest pain, discomfort, palpitations, shortness of breath orthopnea, PND or lower extremity edema  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, [facility], TTE and MPI stress test in the past (6/2022) which have been reportedly normal (results are unavailable)  TTE from 1/2023 showed preserved LVEF 60-65% with normal diastolic function    Has history of anxiety and depression and takes Lexapro and Wellbutrin on every day basis  Prior EKG from last month (4/2023) show normal QTc interval of 421ms  Telemetry does not show any significant arrhythmia  Past Medical History:        Past Medical History:   Diagnosis Date   • ADHD    • Anxiety       Past Surgical History:   Procedure Laterality Date   • ROOT CANAL          Allergy:        No Known Allergies    Medications:       Prior to Admission medications    Medication Sig Start Date End Date Taking? Authorizing Provider   buPROPion (WELLBUTRIN XL) 150 mg 24 hr tablet Take 150 mg by mouth daily at bedtime   Yes Historical Provider, MD   escitalopram (LEXAPRO) 20 mg tablet Take 20 mg by mouth daily at bedtime   Yes Historical Provider, MD       Family History:     History reviewed  No pertinent family history  Social History:       Social History     Socioeconomic History   • Marital status: /Civil Union     Spouse name: None   • Number of children: None   • Years of education: None   • Highest education level: None   Occupational History   • None   Tobacco Use   • Smoking status: Former   • Smokeless tobacco: Never   Vaping Use   • Vaping Use: Every day   • Substances: Nicotine, Flavoring   Substance and Sexual Activity   • Alcohol use: Yes     Comment: socially    • Drug use: Never   • Sexual activity: None   Other Topics Concern   • None   Social History Narrative   • None     Social Determinants of Health     Financial Resource Strain: Not on file   Food Insecurity: Not on file   Transportation Needs: Not on file   Physical Activity: Not on file   Stress: Not on file   Social Connections: Not on file   Intimate Partner Violence: Not on file   Housing Stability: Not on file     Weights/BMI:    Wt Readings from Last 3 Encounters:   05/16/23 81 3 kg (179 lb 3 7 oz)   04/10/22 76 2 kg (168 lb)   11/17/21 76 2 kg (168 lb)   , Body mass index is 28 07 kg/m²      ROS:  14 point ROS negative except as outlined above  Remainder review of systems is negative    Exam:  General: Alert, oriented and in no acute distress, cooperative  Head: Normocephalic, atraumatic  Eyes: PERRLA  No icterus  Normal Conjunctiva  Oropharynx: Moist and normal-appearing mucosa  Neck: Supple, symmetrical, trachea midline, JVD not appreciated     Heart: RRR, no murmur, rub or gallop, S1 & S2 normal   Lungs: Normal air entry, lungs clear to auscultation and no rales, rhonchi or wheezing   Abdomen: Flat, normal findings: bowel sounds normal and soft, non-tender  Lower Limbs:  No pitting edema, 2+ peripheral pulses, capillary refill within normal limits  Musculoskeletal: ROM grossly normal

## 2023-05-17 PROBLEM — R79.89 ELEVATED SERUM CREATININE: Status: RESOLVED | Noted: 2023-05-15 | Resolved: 2023-05-17

## 2023-05-17 LAB
ALBUMIN SERPL BCP-MCNC: 3.4 G/DL (ref 3.5–5)
ALP SERPL-CCNC: 46 U/L (ref 34–104)
ALT SERPL W P-5'-P-CCNC: 36 U/L (ref 7–52)
ANION GAP SERPL CALCULATED.3IONS-SCNC: 5 MMOL/L (ref 4–13)
AST SERPL W P-5'-P-CCNC: 32 U/L (ref 13–39)
BASOPHILS # BLD AUTO: 0.02 THOUSANDS/ÂΜL (ref 0–0.1)
BASOPHILS # BLD AUTO: 0.03 THOUSANDS/ÂΜL (ref 0–0.1)
BASOPHILS NFR BLD AUTO: 1 % (ref 0–1)
BASOPHILS NFR BLD AUTO: 1 % (ref 0–1)
BILIRUB SERPL-MCNC: 0.79 MG/DL (ref 0.2–1)
BUN SERPL-MCNC: 11 MG/DL (ref 5–25)
CALCIUM ALBUM COR SERPL-MCNC: 8.3 MG/DL (ref 8.3–10.1)
CALCIUM SERPL-MCNC: 7.8 MG/DL (ref 8.4–10.2)
CHLORIDE SERPL-SCNC: 107 MMOL/L (ref 96–108)
CO2 SERPL-SCNC: 24 MMOL/L (ref 21–32)
CREAT SERPL-MCNC: 0.8 MG/DL (ref 0.6–1.3)
EOSINOPHIL # BLD AUTO: 0 THOUSAND/ÂΜL (ref 0–0.61)
EOSINOPHIL # BLD AUTO: 0.03 THOUSAND/ÂΜL (ref 0–0.61)
EOSINOPHIL NFR BLD AUTO: 0 % (ref 0–6)
EOSINOPHIL NFR BLD AUTO: 1 % (ref 0–6)
ERYTHROCYTE [DISTWIDTH] IN BLOOD BY AUTOMATED COUNT: 12.6 % (ref 11.6–15.1)
ERYTHROCYTE [DISTWIDTH] IN BLOOD BY AUTOMATED COUNT: 12.8 % (ref 11.6–15.1)
GFR SERPL CREATININE-BSD FRML MDRD: 112 ML/MIN/1.73SQ M
GLUCOSE SERPL-MCNC: 99 MG/DL (ref 65–140)
HCT VFR BLD AUTO: 38.7 % (ref 36.5–49.3)
HCT VFR BLD AUTO: 39.9 % (ref 36.5–49.3)
HGB BLD-MCNC: 12.9 G/DL (ref 12–17)
HGB BLD-MCNC: 13.3 G/DL (ref 12–17)
IMM GRANULOCYTES # BLD AUTO: 0.01 THOUSAND/UL (ref 0–0.2)
IMM GRANULOCYTES # BLD AUTO: 0.02 THOUSAND/UL (ref 0–0.2)
IMM GRANULOCYTES NFR BLD AUTO: 0 % (ref 0–2)
IMM GRANULOCYTES NFR BLD AUTO: 0 % (ref 0–2)
LYMPHOCYTES # BLD AUTO: 0.9 THOUSANDS/ÂΜL (ref 0.6–4.47)
LYMPHOCYTES # BLD AUTO: 1.27 THOUSANDS/ÂΜL (ref 0.6–4.47)
LYMPHOCYTES NFR BLD AUTO: 19 % (ref 14–44)
LYMPHOCYTES NFR BLD AUTO: 32 % (ref 14–44)
MAGNESIUM SERPL-MCNC: 2.2 MG/DL (ref 1.9–2.7)
MCH RBC QN AUTO: 29 PG (ref 26.8–34.3)
MCH RBC QN AUTO: 29 PG (ref 26.8–34.3)
MCHC RBC AUTO-ENTMCNC: 33.3 G/DL (ref 31.4–37.4)
MCHC RBC AUTO-ENTMCNC: 33.3 G/DL (ref 31.4–37.4)
MCV RBC AUTO: 87 FL (ref 82–98)
MCV RBC AUTO: 87 FL (ref 82–98)
MONOCYTES # BLD AUTO: 0.72 THOUSAND/ÂΜL (ref 0.17–1.22)
MONOCYTES # BLD AUTO: 0.8 THOUSAND/ÂΜL (ref 0.17–1.22)
MONOCYTES NFR BLD AUTO: 17 % (ref 4–12)
MONOCYTES NFR BLD AUTO: 18 % (ref 4–12)
NEUTROPHILS # BLD AUTO: 1.87 THOUSANDS/ÂΜL (ref 1.85–7.62)
NEUTROPHILS # BLD AUTO: 3 THOUSANDS/ÂΜL (ref 1.85–7.62)
NEUTS SEG NFR BLD AUTO: 48 % (ref 43–75)
NEUTS SEG NFR BLD AUTO: 63 % (ref 43–75)
NRBC BLD AUTO-RTO: 0 /100 WBCS
NRBC BLD AUTO-RTO: 0 /100 WBCS
PLATELET # BLD AUTO: 138 THOUSANDS/UL (ref 149–390)
PLATELET # BLD AUTO: 149 THOUSANDS/UL (ref 149–390)
PMV BLD AUTO: 10.3 FL (ref 8.9–12.7)
PMV BLD AUTO: 10.5 FL (ref 8.9–12.7)
POTASSIUM SERPL-SCNC: 3.4 MMOL/L (ref 3.5–5.3)
PROCALCITONIN SERPL-MCNC: 0.24 NG/ML
PROT SERPL-MCNC: 6 G/DL (ref 6.4–8.4)
RBC # BLD AUTO: 4.45 MILLION/UL (ref 3.88–5.62)
RBC # BLD AUTO: 4.58 MILLION/UL (ref 3.88–5.62)
SODIUM SERPL-SCNC: 136 MMOL/L (ref 135–147)
WBC # BLD AUTO: 3.92 THOUSAND/UL (ref 4.31–10.16)
WBC # BLD AUTO: 4.75 THOUSAND/UL (ref 4.31–10.16)

## 2023-05-17 RX ORDER — POTASSIUM CHLORIDE 20 MEQ/1
40 TABLET, EXTENDED RELEASE ORAL ONCE
Status: COMPLETED | OUTPATIENT
Start: 2023-05-17 | End: 2023-05-17

## 2023-05-17 RX ADMIN — POTASSIUM CHLORIDE 40 MEQ: 1500 TABLET, EXTENDED RELEASE ORAL at 08:22

## 2023-05-17 RX ADMIN — BUPROPION HYDROCHLORIDE 150 MG: 150 TABLET, EXTENDED RELEASE ORAL at 21:14

## 2023-05-17 RX ADMIN — SODIUM CHLORIDE 100 ML/HR: 0.9 INJECTION, SOLUTION INTRAVENOUS at 05:07

## 2023-05-17 RX ADMIN — ESCITALOPRAM OXALATE 20 MG: 10 TABLET ORAL at 21:14

## 2023-05-17 RX ADMIN — PANTOPRAZOLE SODIUM 40 MG: 40 INJECTION, POWDER, FOR SOLUTION INTRAVENOUS at 08:22

## 2023-05-17 RX ADMIN — IBUPROFEN 600 MG: 600 TABLET, FILM COATED ORAL at 00:45

## 2023-05-17 NOTE — ASSESSMENT & PLAN NOTE
· Cr 1 38  · Baseline 1-1 2  · Does not meet JOSE ALEJANDRO criteria   · Suspect elevation in setting of nausea, vomiting, diarrhea  · Receiving IV fluids  · Resolved, creatinine 0 8

## 2023-05-17 NOTE — ASSESSMENT & PLAN NOTE
· 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     · Follows with [facility] cardiology team    · ECHO performed in January 2023: EF 11-65%, normal diastolic function

## 2023-05-17 NOTE — PROGRESS NOTES
[place]  Progress Note  Name: Cinthya Morton  MRN: [id number]  Unit/Bed#: -01 I Date of Admission: 5/15/2023   Date of Service: 5/17/2023 I Hospital Day: 0    Assessment/Plan   * Nausea vomiting and diarrhea  Assessment & Plan  · Presents from home due to nausea, vomiting and diarrhea over the past 2 days  Reports his son was sick with similar illness 1 week ago  However, son's symptoms lasted for less than 24 hours  · Potassium 3 4  · WBC 16 86 and Hg 17 1  Suspect hemoconcentration from dehydration  · Improved to WBC 4 and hgb 13  · QTc elevated in the ER  Tigan given  · Suspect viral GI illness  Would hold off on starting antibiotics at this time  · Stool cultures pending, await results  Patient still having diarrhea, however improving  · CT A/P pending  · COVID/flu is negative  · Continue Tigan as needed  May utilize IV Benadryl if needed    Viral sepsis [facility])  Assessment & Plan  · N/V/D x 2 days  Diarrhea has improved  Continues to have nausea and vomiting   · SIRS criteria:   · Tachycardia, fever, leukocytosis  · Lactic and procal ordered    · SOI: Suspected viral GI illness   · ABx: None  Monitor off abx   · Stool studies pending  · IV fluids  Continue to monitor labs  If WBC does not improve despite IV fluids consider initiating abx       Nicotine abuse  Assessment & Plan  · Vapes nicotine daily   · Encourage cessation     Anxiety and depression  Assessment & Plan  · Continue home lexapro and wellbutrin     Prolonged Q-T interval on ECG  Assessment & Plan  · QT//627 on EKG in the ER   · Prior /421 (04/2022)   · Suspected prolongation due to Nausea, vomiting, diarrhea causing dehydration   · Administer potassium and IV magnesium   · IV fluids   · Telemetry   · Cardiology following    GERD (gastroesophageal reflux disease)  Assessment & Plan  · Takes Prilosec outpatient   · IV PPI while inpatient    Partial anomalous pulmonary venous return (PAPVR)  Assessment & Plan  · Congenital but was not diagnosed till 2022  · Follows with [facility] cardiology team    · ECHO performed in 2023: EF 42-76%, normal diastolic function     Elevated serum creatinine-resolved as of 2023  Assessment & Plan  · Cr 1 38  · Baseline 1-1 2  · Does not meet JOSE ALEJANDRO criteria   · Suspect elevation in setting of nausea, vomiting, diarrhea  · Receiving IV fluids  · Resolved, creatinine 0 8             VTE Pharmacologic Prophylaxis: VTE Score: 2 Low Risk (Score 0-2) - Encourage Ambulation  Patient Centered Rounds: I performed bedside rounds with nursing staff today  Discussions with Specialists or Other Care Team Provider: None    Education and Discussions with Family / Patient: Updated  (wife) via phone  Total Time Spent on Date of Encounter in care of patient: 55 minutes This time was spent on one or more of the following: performing physical exam; counseling and coordination of care; obtaining or reviewing history; documenting in the medical record; reviewing/ordering tests, medications or procedures; communicating with other healthcare professionals and discussing with patient's family/caregivers  Current Length of Stay: 0 day(s)  Current Patient Status: Inpatient   Certification Statement: The patient will continue to require additional inpatient hospital stay due to awaiting stool studies and CT imaging  Discharge Plan: Anticipate discharge tomorrow to home  Code Status: Level 1 - Full Code    Subjective:   No acute events overnight  Pt reports improvement in diarrhea  Tolerating food without nausea/vomiting  Objective:     Vitals:   Temp (24hrs), Av 5 °F (38 1 °C), Min:98 4 °F (36 9 °C), Max:102 8 °F (39 3 °C)    Temp:  [98 4 °F (36 9 °C)-102 8 °F (39 3 °C)] 98 9 °F (37 2 °C)  HR:  [] 89  Resp:  [16-22] 16  BP: (126-139)/(80-93) 126/80  SpO2:  [94 %-96 %] 95 %  Body mass index is 27 31 kg/m²       Input and Output Summary (last 24 hours): Intake/Output Summary (Last 24 hours) at 5/17/2023 1354  Last data filed at 5/17/2023 0900  Gross per 24 hour   Intake 720 ml   Output --   Net 720 ml       Physical Exam:   Physical Exam  Vitals and nursing note reviewed  Constitutional:       Appearance: Normal appearance  HENT:      Head: Normocephalic and atraumatic  Mouth/Throat:      Mouth: Mucous membranes are moist       Pharynx: Oropharynx is clear  No oropharyngeal exudate  Eyes:      Extraocular Movements: Extraocular movements intact  Cardiovascular:      Rate and Rhythm: Normal rate and regular rhythm  Pulses: Normal pulses  Heart sounds: Normal heart sounds  No murmur heard  No friction rub  No gallop  Pulmonary:      Effort: Pulmonary effort is normal  No respiratory distress  Breath sounds: Normal breath sounds  No stridor  No wheezing or rales  Abdominal:      General: Abdomen is flat  Bowel sounds are normal  There is no distension  Palpations: Abdomen is soft  Tenderness: There is no abdominal tenderness  Musculoskeletal:      Right lower leg: No edema  Left lower leg: No edema  Skin:     General: Skin is warm and dry  Neurological:      General: No focal deficit present  Mental Status: He is alert and oriented to person, place, and time            Additional Data:     Labs:  Results from last 7 days   Lab Units 05/17/23  0427   WBC Thousand/uL 4 75   HEMOGLOBIN g/dL 13 3   HEMATOCRIT % 39 9   PLATELETS Thousands/uL 138*   NEUTROS PCT % 63   LYMPHS PCT % 19   MONOS PCT % 17*   EOS PCT % 0     Results from last 7 days   Lab Units 05/17/23  0427   SODIUM mmol/L 136   POTASSIUM mmol/L 3 4*   CHLORIDE mmol/L 107   CO2 mmol/L 24   BUN mg/dL 11   CREATININE mg/dL 0 80   ANION GAP mmol/L 5   CALCIUM mg/dL 7 8*   ALBUMIN g/dL 3 4*   TOTAL BILIRUBIN mg/dL 0 79   ALK PHOS U/L 46   ALT U/L 36   AST U/L 32   GLUCOSE RANDOM mg/dL 99                 Results from last 7 days   Lab Units 05/17/23  0427 05/16/23  0038   LACTIC ACID mmol/L  --  1 0   PROCALCITONIN ng/ml 0 24 0 18       Lines/Drains:  Invasive Devices     Peripheral Intravenous Line  Duration           Peripheral IV 05/15/23 Right Antecubital 1 day                  Telemetry:  Telemetry Orders (From admission, onward)             24 Hour Telemetry Monitoring  Continuous x 24 Hours (Telem)        Comments: Arrhythmia   No syncope   Question:  Reason for 24 Hour Telemetry  Answer:  Syncope suspected to be cardiac in origin                 Telemetry Reviewed: Normal Sinus Rhythm  Indication for Continued Telemetry Use: No indication for continued use  Will discontinue  Imaging: No pertinent imaging reviewed  Recent Cultures (last 7 days):         Last 24 Hours Medication List:   Current Facility-Administered Medications   Medication Dose Route Frequency Provider Last Rate   • acetaminophen  650 mg Oral Q6H PRN Read CASSIE Bauman     • buPROPion  150 mg Oral HS Stacy Hernandez PA-C     • escitalopram  20 mg Oral HS tSacy Hernandez PA-C     • ibuprofen  600 mg Oral Q6H PRN Read CASSIE Bauman     • pantoprazole  40 mg Intravenous Q24H [address] Stacy Hernandez PA-C     • sodium chloride  100 mL/hr Intravenous Continuous Read CASSIE Bauman 100 mL/hr (05/17/23 0507)   • trimethobenzamide  100 mg Intramuscular Q6H PRN Read CASSIE Bauman          Today, Patient Was Seen By: Du Alexis PA-C    **Please Note: This note may have been constructed using a voice recognition system  **

## 2023-05-17 NOTE — PLAN OF CARE
Problem: PAIN - ADULT  Goal: Verbalizes/displays adequate comfort level or baseline comfort level  Description: Interventions:  - Encourage patient to monitor pain and request assistance  - Assess pain using appropriate pain scale  - Administer analgesics based on type and severity of pain and evaluate response  - Implement non-pharmacological measures as appropriate and evaluate response  - Consider cultural and social influences on pain and pain management  - Notify physician/advanced practitioner if interventions unsuccessful or patient reports new pain  Outcome: Progressing     Problem: INFECTION - ADULT  Goal: Absence or prevention of progression during hospitalization  Description: INTERVENTIONS:  - Assess and monitor for signs and symptoms of infection  - Monitor lab/diagnostic results  - Monitor all insertion sites, i e  indwelling lines, tubes, and drains  - Monitor endotracheal if appropriate and nasal secretions for changes in amount and color  - [place] appropriate cooling/warming therapies per order  - Administer medications as ordered  - Instruct and encourage patient and family to use good hand hygiene technique  - Identify and instruct in appropriate isolation precautions for identified infection/condition  Outcome: Progressing     Problem: SAFETY ADULT  Goal: Patient will remain free of falls  Description: INTERVENTIONS:  - Educate patient/family on patient safety including physical limitations  - Instruct patient to call for assistance with activity   - Consult OT/PT to assist with strengthening/mobility   - Keep Call bell within reach  - Keep bed low and locked with side rails adjusted as appropriate  - Keep care items and personal belongings within reach  - Initiate and maintain comfort rounds  - Make Fall Risk Sign visible to staff  - Offer Toileting every 2 Hours, in advance of need  - Initiate/Maintain alarm  - Obtain necessary fall risk management equipment: socks  - Apply yellow socks and bracelet for high fall risk patients  - Consider moving patient to room near nurses station  Outcome: Progressing     Problem: DISCHARGE PLANNING  Goal: Discharge to home or other facility with appropriate resources  Description: INTERVENTIONS:  - Identify barriers to discharge w/patient and caregiver  - Arrange for needed discharge resources and transportation as appropriate  - Identify discharge learning needs (meds, wound care, etc )  - Arrange for interpretive services to assist at discharge as needed  - Refer to Case Management Department for coordinating discharge planning if the patient needs post-hospital services based on physician/advanced practitioner order or complex needs related to functional status, cognitive ability, or social support system  Outcome: Progressing     Problem: Knowledge Deficit  Goal: Patient/family/caregiver demonstrates understanding of disease process, treatment plan, medications, and discharge instructions  Description: Complete learning assessment and assess knowledge base    Interventions:  - Provide teaching at level of understanding  - Provide teaching via preferred learning methods  Outcome: Progressing     Problem: CARDIOVASCULAR - ADULT  Goal: Maintains optimal cardiac output and hemodynamic stability  Description: INTERVENTIONS:  - Monitor I/O, vital signs and rhythm  - Monitor for S/S and trends of decreased cardiac output  - Administer and titrate ordered vasoactive medications to optimize hemodynamic stability  - Assess quality of pulses, skin color and temperature  - Assess for signs of decreased coronary artery perfusion  - Instruct patient to report change in severity of symptoms  Outcome: Progressing  Goal: Absence of cardiac dysrhythmias or at baseline rhythm  Description: INTERVENTIONS:  - Continuous cardiac monitoring, vital signs, obtain 12 lead EKG if ordered  - Administer antiarrhythmic and heart rate control medications as ordered  - Monitor electrolytes and administer replacement therapy as ordered  Outcome: Progressing     Problem: GASTROINTESTINAL - ADULT  Goal: Minimal or absence of nausea and/or vomiting  Description: INTERVENTIONS:  - Administer IV fluids if ordered to ensure adequate hydration  - Maintain NPO status until nausea and vomiting are resolved  - Nasogastric tube if ordered  - Administer ordered antiemetic medications as needed  - Provide nonpharmacologic comfort measures as appropriate  - Advance diet as tolerated, if ordered  - Consider nutrition services referral to assist patient with adequate nutrition and appropriate food choices  Outcome: Progressing  Goal: Maintains or returns to baseline bowel function  Description: INTERVENTIONS:  - Assess bowel function  - Encourage oral fluids to ensure adequate hydration  - Administer IV fluids if ordered to ensure adequate hydration  - Administer ordered medications as needed  - Encourage mobilization and activity  - Consider nutritional services referral to assist patient with adequate nutrition and appropriate food choices  Outcome: Progressing  Goal: Maintains adequate nutritional intake  Description: INTERVENTIONS:  - Monitor percentage of each meal consumed  - Identify factors contributing to decreased intake, treat as appropriate  - Assist with meals as needed  - Monitor I&O, weight, and lab values if indicated  - Obtain nutrition services referral as needed  Outcome: Progressing     Problem: METABOLIC, FLUID AND ELECTROLYTES - ADULT  Goal: Electrolytes maintained within normal limits  Description: INTERVENTIONS:  - Monitor labs and assess patient for signs and symptoms of electrolyte imbalances  - Administer electrolyte replacement as ordered  - Monitor response to electrolyte replacements, including repeat lab results as appropriate  - Instruct patient on fluid and nutrition as appropriate  Outcome: Progressing  Goal: Fluid balance maintained  Description: INTERVENTIONS:  - Monitor labs   - Monitor I/O and WT  - Instruct patient on fluid and nutrition as appropriate  - Assess for signs & symptoms of volume excess or deficit  Outcome: Progressing     Problem: Nutrition/Hydration-ADULT  Goal: Nutrient/Hydration intake appropriate for improving, restoring or maintaining nutritional needs  Description: Monitor and assess patient's nutrition/hydration status for malnutrition  Collaborate with interdisciplinary team and initiate plan and interventions as ordered  Monitor patient's weight and dietary intake as ordered or per policy  Utilize nutrition screening tool and intervene as necessary  Determine patient's food preferences and provide high-protein, high-caloric foods as appropriate       INTERVENTIONS:  - Monitor oral intake, urinary output, labs, and treatment plans  - Assess nutrition and hydration status and recommend course of action  - Evaluate amount of meals eaten  - Assist patient with eating if necessary   - Allow adequate time for meals  - Recommend/ encourage appropriate diets, oral nutritional supplements, and vitamin/mineral supplements  - Order, calculate, and assess calorie counts as needed  - Recommend, monitor, and adjust tube feedings and TPN/PPN based on assessed needs  - Assess need for intravenous fluids  - Provide specific nutrition/hydration education as appropriate  - Include patient/family/caregiver in decisions related to nutrition  Outcome: Progressing     Problem: SKIN/TISSUE INTEGRITY - ADULT  Goal: Skin Integrity remains intact(Skin Breakdown Prevention)  Description: Assess:  -Perform [place] assessment every x  -Clean and moisturize skin every x  -Inspect skin when repositioning, toileting, and assisting with ADLS  -Assess under medical devices such as x every x  -Assess extremities for adequate circulation and sensation     Bed Management:  -Have minimal linens on bed & keep smooth, unwrinkled  -Change linens as needed when moist or perspiring  -Avoid sitting or lying in one position for more than x hours while in bed  -Keep HOB at [facility]     Toileting:  -Offer bedside commode  -Assess for incontinence every x  -Use incontinent care products after each incontinent episode such as x    Activity:  -Mobilize patient x times a day  -Encourage activity and walks on unit  -Encourage or provide ROM exercises   -Turn and reposition patient every x Hours  -Use appropriate equipment to lift or move patient in bed  -Instruct/ Assist with weight shifting every x when out of bed in chair  -Consider limitation of chair time x hour intervals    Skin Care:  -Avoid use of baby powder, tape, friction and shearing, hot water or constrictive clothing  -Relieve pressure over bony prominences using x  -Do not massage red bony areas    Next Steps:  -Teach patient strategies to minimize risks such as x   -Consider consults to  interdisciplinary teams such as x  Outcome: Progressing     Problem: HEMATOLOGIC - ADULT  Goal: Maintains hematologic stability  Description: INTERVENTIONS  - Assess for signs and symptoms of bleeding or hemorrhage  - Monitor labs  - Administer supportive blood products/factors as ordered and appropriate  Outcome: Progressing     Problem: MUSCULOSKELETAL - ADULT  Goal: Maintain or return mobility to safest level of function  Description: INTERVENTIONS:  - Assess patient's ability to carry out ADLs; assess patient's baseline for ADL function and identify physical deficits which impact ability to perform ADLs (bathing, care of mouth/teeth, toileting, grooming, dressing, etc )  - Assess/evaluate cause of self-care deficits   - Assess range of motion  - Assess patient's mobility  - Assess patient's need for assistive devices and provide as appropriate  - Encourage maximum independence but intervene and supervise when necessary  - Involve family in performance of ADLs  - Assess for home care needs following discharge   - Consider OT consult to assist with ADL evaluation and planning for discharge  - Provide patient education as appropriate  Outcome: Progressing     Problem: MOBILITY - ADULT  Goal: Maintain or return to baseline ADL function  Description: INTERVENTIONS:  -  Assess patient's ability to carry out ADLs; assess patient's baseline for ADL function and identify physical deficits which impact ability to perform ADLs (bathing, care of mouth/teeth, toileting, grooming, dressing, etc )  - Assess/evaluate cause of self-care deficits   - Assess range of motion  - Assess patient's mobility; develop plan if impaired  - Assess patient's need for assistive devices and provide as appropriate  - Encourage maximum independence but intervene and supervise when necessary  - Involve family in performance of ADLs  - Assess for home care needs following discharge   - Consider OT consult to assist with ADL evaluation and planning for discharge  - Provide patient education as appropriate  Outcome: Progressing  Goal: Maintains/Returns to pre admission functional level  Description: INTERVENTIONS:  - Perform BMAT or MOVE assessment daily    - Set and communicate daily mobility goal to care team and patient/family/caregiver  - Collaborate with rehabilitation services on mobility goals if consulted  - Perform Range of Motion x times a day  - Reposition patient every x hours    - Dangle patient x times a day  - Stand patient x times a day  - Ambulate patient x times a day  - Out of bed to chair x times a day   - Out of bed for meals x times a day  - Out of bed for toileting  - Record patient progress and toleration of activity level   Outcome: Progressing     Problem: Prexisting or High Potential for Compromised Skin Integrity  Goal: Skin integrity is maintained or improved  Description: INTERVENTIONS:  - Identify patients at risk for skin breakdown  - Assess and monitor skin integrity  - Assess and monitor nutrition and hydration status  - Monitor labs   - Assess for incontinence   - Turn and reposition patient  - Assist with mobility/ambulation  - Relieve pressure over bony prominences  - Avoid friction and shearing  - Provide appropriate hygiene as needed including keeping skin clean and dry  - Evaluate need for skin moisturizer/barrier cream  - Collaborate with interdisciplinary team   - Patient/family teaching  - Consider wound care consult   Outcome: Progressing     Problem: Potential for Falls  Goal: Patient will remain free of falls  Description: INTERVENTIONS:  - Educate patient/family on patient safety including physical limitations  - Instruct patient to call for assistance with activity   - Consult OT/PT to assist with strengthening/mobility   - Keep Call bell within reach  - Keep bed low and locked with side rails adjusted as appropriate  - Keep care items and personal belongings within reach  - Initiate and maintain comfort rounds  - Make Fall Risk Sign visible to staff  - Offer Toileting every 2 Hours, in advance of need  - Initiate/Maintain alarm  - Obtain necessary fall risk management equipment: socks  - Apply yellow socks and bracelet for high fall risk patients  - Consider moving patient to room near nurses station  Outcome: Progressing

## 2023-05-17 NOTE — ASSESSMENT & PLAN NOTE
· QT//627 on EKG in the ER   · Prior /421 (04/2022)   · Suspected prolongation due to Nausea, vomiting, diarrhea causing dehydration   · Administer potassium and IV magnesium   · IV fluids   · Telemetry   · Cardiology following

## 2023-05-17 NOTE — ASSESSMENT & PLAN NOTE
· Presents from home due to nausea, vomiting and diarrhea over the past 2 days  Reports his son was sick with similar illness 1 week ago  However, son's symptoms lasted for less than 24 hours  · Potassium 3 4  · WBC 16 86 and Hg 17 1  Suspect hemoconcentration from dehydration  · Improved to WBC 4 and hgb 13  · QTc elevated in the ER  Tigan given  · Suspect viral GI illness  Would hold off on starting antibiotics at this time  · Stool cultures pending, await results  Patient still having diarrhea, however improving  · CT A/P pending  · COVID/flu is negative  · Continue Tigan as needed    May utilize IV Benadryl if needed

## 2023-05-17 NOTE — PLAN OF CARE
Problem: PAIN - ADULT  Goal: Verbalizes/displays adequate comfort level or baseline comfort level  Description: Interventions:  - Encourage patient to monitor pain and request assistance  - Assess pain using appropriate pain scale  - Administer analgesics based on type and severity of pain and evaluate response  - Implement non-pharmacological measures as appropriate and evaluate response  - Consider cultural and social influences on pain and pain management  - Notify physician/advanced practitioner if interventions unsuccessful or patient reports new pain  Outcome: Progressing     Problem: INFECTION - ADULT  Goal: Absence or prevention of progression during hospitalization  Description: INTERVENTIONS:  - Assess and monitor for signs and symptoms of infection  - Monitor lab/diagnostic results  - Monitor all insertion sites, i e  indwelling lines, tubes, and drains  - Monitor endotracheal if appropriate and nasal secretions for changes in amount and color  - [place] appropriate cooling/warming therapies per order  - Administer medications as ordered  - Instruct and encourage patient and family to use good hand hygiene technique  - Identify and instruct in appropriate isolation precautions for identified infection/condition  Outcome: Progressing  Goal: Absence of fever/infection during neutropenic period  Description: INTERVENTIONS:  - Monitor WBC    Outcome: Progressing     Problem: SAFETY ADULT  Goal: Patient will remain free of falls  Description: INTERVENTIONS:  - Educate patient/family on patient safety including physical limitations  - Instruct patient to call for assistance with activity   - Consult OT/PT to assist with strengthening/mobility   - Keep Call bell within reach  - Keep bed low and locked with side rails adjusted as appropriate  - Keep care items and personal belongings within reach  - Initiate and maintain comfort rounds  - Make Fall Risk Sign visible to staff  - Offer Toileting every 2 Hours, in advance of need  - Initiate/Maintain alarm  - Obtain necessary fall risk management equipment: socks  - Apply yellow socks and bracelet for high fall risk patients  - Consider moving patient to room near nurses station  Outcome: Progressing  Goal: Maintain or return to baseline ADL function  Description: INTERVENTIONS:  -  Assess patient's ability to carry out ADLs; assess patient's baseline for ADL function and identify physical deficits which impact ability to perform ADLs (bathing, care of mouth/teeth, toileting, grooming, dressing, etc )  - Assess/evaluate cause of self-care deficits   - Assess range of motion  - Assess patient's mobility; develop plan if impaired  - Assess patient's need for assistive devices and provide as appropriate  - Encourage maximum independence but intervene and supervise when necessary  - Involve family in performance of ADLs  - Assess for home care needs following discharge   - Consider OT consult to assist with ADL evaluation and planning for discharge  - Provide patient education as appropriate  Outcome: Progressing  Goal: Maintains/Returns to pre admission functional level  Description: INTERVENTIONS:  - Perform BMAT or MOVE assessment daily    - Set and communicate daily mobility goal to care team and patient/family/caregiver  - Collaborate with rehabilitation services on mobility goals if consulted  - Perform Range of Motion 3 times a day  - Reposition patient every 3 hours    - Dangle patient 3 times a day  - Stand patient 3 times a day  - Ambulate patient 3 times a day  - Out of bed to chair 3 times a day   - Out of bed for meals 3 times a day  - Out of bed for toileting  - Record patient progress and toleration of activity level   Outcome: Progressing     Problem: DISCHARGE PLANNING  Goal: Discharge to home or other facility with appropriate resources  Description: INTERVENTIONS:  - Identify barriers to discharge w/patient and caregiver  - Arrange for needed discharge resources and transportation as appropriate  - Identify discharge learning needs (meds, wound care, etc )  - Arrange for interpretive services to assist at discharge as needed  - Refer to Case Management Department for coordinating discharge planning if the patient needs post-hospital services based on physician/advanced practitioner order or complex needs related to functional status, cognitive ability, or social support system  Outcome: Progressing     Problem: Knowledge Deficit  Goal: Patient/family/caregiver demonstrates understanding of disease process, treatment plan, medications, and discharge instructions  Description: Complete learning assessment and assess knowledge base    Interventions:  - Provide teaching at level of understanding  - Provide teaching via preferred learning methods  Outcome: Progressing     Problem: CARDIOVASCULAR - ADULT  Goal: Maintains optimal cardiac output and hemodynamic stability  Description: INTERVENTIONS:  - Monitor I/O, vital signs and rhythm  - Monitor for S/S and trends of decreased cardiac output  - Administer and titrate ordered vasoactive medications to optimize hemodynamic stability  - Assess quality of pulses, skin color and temperature  - Assess for signs of decreased coronary artery perfusion  - Instruct patient to report change in severity of symptoms  Outcome: Progressing  Goal: Absence of cardiac dysrhythmias or at baseline rhythm  Description: INTERVENTIONS:  - Continuous cardiac monitoring, vital signs, obtain 12 lead EKG if ordered  - Administer antiarrhythmic and heart rate control medications as ordered  - Monitor electrolytes and administer replacement therapy as ordered  Outcome: Progressing     Problem: GASTROINTESTINAL - ADULT  Goal: Minimal or absence of nausea and/or vomiting  Description: INTERVENTIONS:  - Administer IV fluids if ordered to ensure adequate hydration  - Maintain NPO status until nausea and vomiting are resolved  - Nasogastric tube if ordered  - Administer ordered antiemetic medications as needed  - Provide nonpharmacologic comfort measures as appropriate  - Advance diet as tolerated, if ordered  - Consider nutrition services referral to assist patient with adequate nutrition and appropriate food choices  Outcome: Progressing  Goal: Maintains or returns to baseline bowel function  Description: INTERVENTIONS:  - Assess bowel function  - Encourage oral fluids to ensure adequate hydration  - Administer IV fluids if ordered to ensure adequate hydration  - Administer ordered medications as needed  - Encourage mobilization and activity  - Consider nutritional services referral to assist patient with adequate nutrition and appropriate food choices  Outcome: Progressing  Goal: Maintains adequate nutritional intake  Description: INTERVENTIONS:  - Monitor percentage of each meal consumed  - Identify factors contributing to decreased intake, treat as appropriate  - Assist with meals as needed  - Monitor I&O, weight, and lab values if indicated  - Obtain nutrition services referral as needed  Outcome: Progressing  Goal: Establish and maintain optimal ostomy function  Description: INTERVENTIONS:  - Assess bowel function  - Encourage oral fluids to ensure adequate hydration  - Administer IV fluids if ordered to ensure adequate hydration   - Administer ordered medications as needed  - Encourage mobilization and activity  - Nutrition services referral to assist patient with appropriate food choices  - Assess stoma site  - Consider wound care consult   Outcome: Progressing  Goal: Oral mucous membranes remain intact  Description: INTERVENTIONS  - Assess oral mucosa and hygiene practices  - Implement preventative oral hygiene regimen  - Implement oral medicated treatments as ordered  - Initiate Nutrition services referral as needed  Outcome: Progressing     Problem: METABOLIC, FLUID AND ELECTROLYTES - ADULT  Goal: Electrolytes maintained within normal limits  Description: INTERVENTIONS:  - Monitor labs and assess patient for signs and symptoms of electrolyte imbalances  - Administer electrolyte replacement as ordered  - Monitor response to electrolyte replacements, including repeat lab results as appropriate  - Instruct patient on fluid and nutrition as appropriate  Outcome: Progressing  Goal: Fluid balance maintained  Description: INTERVENTIONS:  - Monitor labs   - Monitor I/O and WT  - Instruct patient on fluid and nutrition as appropriate  - Assess for signs & symptoms of volume excess or deficit  Outcome: Progressing  Goal: Glucose maintained within target range  Description: INTERVENTIONS:  - Monitor Blood Glucose as ordered  - Assess for signs and symptoms of hyperglycemia and hypoglycemia  - Administer ordered medications to maintain glucose within target range  - Assess nutritional intake and initiate nutrition service referral as needed  Outcome: Progressing

## 2023-05-18 VITALS
DIASTOLIC BLOOD PRESSURE: 76 MMHG | TEMPERATURE: 99.2 F | OXYGEN SATURATION: 96 % | SYSTOLIC BLOOD PRESSURE: 125 MMHG | WEIGHT: 169.4 LBS | BODY MASS INDEX: 26.59 KG/M2 | HEIGHT: 67 IN | HEART RATE: 81 BPM | RESPIRATION RATE: 16 BRPM

## 2023-05-18 PROBLEM — R94.31 PROLONGED Q-T INTERVAL ON ECG: Status: RESOLVED | Noted: 2023-05-15 | Resolved: 2023-05-18

## 2023-05-18 LAB
ANION GAP SERPL CALCULATED.3IONS-SCNC: 4 MMOL/L (ref 4–13)
BUN SERPL-MCNC: 8 MG/DL (ref 5–25)
C DIFF TOX GENS STL QL NAA+PROBE: NEGATIVE
CALCIUM SERPL-MCNC: 8.5 MG/DL (ref 8.4–10.2)
CAMPYLOBACTER DNA SPEC NAA+PROBE: NORMAL
CHLORIDE SERPL-SCNC: 105 MMOL/L (ref 96–108)
CO2 SERPL-SCNC: 27 MMOL/L (ref 21–32)
CREAT SERPL-MCNC: 0.86 MG/DL (ref 0.6–1.3)
GFR SERPL CREATININE-BSD FRML MDRD: 109 ML/MIN/1.73SQ M
GLUCOSE SERPL-MCNC: 94 MG/DL (ref 65–140)
POTASSIUM SERPL-SCNC: 3.4 MMOL/L (ref 3.5–5.3)
SALMONELLA DNA SPEC QL NAA+PROBE: NORMAL
SHIGA TOXIN STX GENE SPEC NAA+PROBE: NORMAL
SHIGELLA DNA SPEC QL NAA+PROBE: NORMAL
SODIUM SERPL-SCNC: 136 MMOL/L (ref 135–147)

## 2023-05-18 RX ORDER — POTASSIUM CHLORIDE 20 MEQ/1
40 TABLET, EXTENDED RELEASE ORAL ONCE
Status: COMPLETED | OUTPATIENT
Start: 2023-05-18 | End: 2023-05-18

## 2023-05-18 RX ADMIN — PANTOPRAZOLE SODIUM 40 MG: 40 INJECTION, POWDER, FOR SOLUTION INTRAVENOUS at 08:41

## 2023-05-18 RX ADMIN — POTASSIUM CHLORIDE 40 MEQ: 1500 TABLET, EXTENDED RELEASE ORAL at 08:40

## 2023-05-18 NOTE — ASSESSMENT & PLAN NOTE
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

## 2023-05-18 NOTE — NURSING NOTE
Reviewed d/c instructions with pt  Pt  Was d/c'd to home and transported by his wife  Pt  Ambulated to [facility] with RN

## 2023-05-18 NOTE — ASSESSMENT & PLAN NOTE
· QT//627 on EKG in the ER   · Prior /421 (04/2022)   · Suspected prolongation due to Nausea, vomiting, diarrhea causing dehydration   · Administer potassium and IV magnesium   · IV fluids   · Telemetry   · Resolved

## 2023-05-18 NOTE — PLAN OF CARE
Problem: PAIN - ADULT  Goal: Verbalizes/displays adequate comfort level or baseline comfort level  Description: Interventions:  - Encourage patient to monitor pain and request assistance  - Assess pain using appropriate pain scale  - Administer analgesics based on type and severity of pain and evaluate response  - Implement non-pharmacological measures as appropriate and evaluate response  - Consider cultural and social influences on pain and pain management  - Notify physician/advanced practitioner if interventions unsuccessful or patient reports new pain  5/18/2023 1053 by Perlita Venegas RN  Outcome: Adequate for Discharge  5/18/2023 0920 by Perlita Venegas RN  Outcome: Progressing     Problem: INFECTION - ADULT  Goal: Absence or prevention of progression during hospitalization  Description: INTERVENTIONS:  - Assess and monitor for signs and symptoms of infection  - Monitor lab/diagnostic results  - Monitor all insertion sites, i e  indwelling lines, tubes, and drains  - Monitor endotracheal if appropriate and nasal secretions for changes in amount and color  - [place] appropriate cooling/warming therapies per order  - Administer medications as ordered  - Instruct and encourage patient and family to use good hand hygiene technique  - Identify and instruct in appropriate isolation precautions for identified infection/condition  5/18/2023 1053 by Perlita Venegas RN  Outcome: Adequate for Discharge  5/18/2023 0920 by Perlita Venegas RN  Outcome: Progressing     Problem: SAFETY ADULT  Goal: Patient will remain free of falls  Description: INTERVENTIONS:  - Educate patient/family on patient safety including physical limitations  - Instruct patient to call for assistance with activity   - Consult OT/PT to assist with strengthening/mobility   - Keep Call bell within reach  - Keep bed low and locked with side rails adjusted as appropriate  - Keep care items and personal belongings within reach  - Initiate and maintain comfort rounds  - Make Fall Risk Sign visible to staff  - Offer Toileting every 2 Hours, in advance of need  - Initiate/Maintain alarm  - Obtain necessary fall risk management equipment: socks  - Apply yellow socks and bracelet for high fall risk patients  - Consider moving patient to room near nurses station  5/18/2023 1053 by Major Benavides RN  Outcome: Adequate for Discharge  5/18/2023 0920 by Major Benavides RN  Outcome: Progressing     Problem: DISCHARGE PLANNING  Goal: Discharge to home or other facility with appropriate resources  Description: INTERVENTIONS:  - Identify barriers to discharge w/patient and caregiver  - Arrange for needed discharge resources and transportation as appropriate  - Identify discharge learning needs (meds, wound care, etc )  - Arrange for interpretive services to assist at discharge as needed  - Refer to Case Management Department for coordinating discharge planning if the patient needs post-hospital services based on physician/advanced practitioner order or complex needs related to functional status, cognitive ability, or social support system  5/18/2023 1053 by Major Benavides RN  Outcome: Adequate for Discharge  5/18/2023 0920 by Major Benavides RN  Outcome: Progressing     Problem: Knowledge Deficit  Goal: Patient/family/caregiver demonstrates understanding of disease process, treatment plan, medications, and discharge instructions  Description: Complete learning assessment and assess knowledge base    Interventions:  - Provide teaching at level of understanding  - Provide teaching via preferred learning methods  5/18/2023 1053 by Major Benavides RN  Outcome: Adequate for Discharge  5/18/2023 0920 by Major Benavides RN  Outcome: Progressing     Problem: CARDIOVASCULAR - ADULT  Goal: Maintains optimal cardiac output and hemodynamic stability  Description: INTERVENTIONS:  - Monitor I/O, vital signs and rhythm  - Monitor for S/S and trends of decreased cardiac output  - Administer and titrate ordered vasoactive medications to optimize hemodynamic stability  - Assess quality of pulses, skin color and temperature  - Assess for signs of decreased coronary artery perfusion  - Instruct patient to report change in severity of symptoms  5/18/2023 1053 by Santana Santa RN  Outcome: Adequate for Discharge  5/18/2023 0920 by Santana Santa RN  Outcome: Progressing  Goal: Absence of cardiac dysrhythmias or at baseline rhythm  Description: INTERVENTIONS:  - Continuous cardiac monitoring, vital signs, obtain 12 lead EKG if ordered  - Administer antiarrhythmic and heart rate control medications as ordered  - Monitor electrolytes and administer replacement therapy as ordered  5/18/2023 1053 by Santana Santa RN  Outcome: Adequate for Discharge  5/18/2023 0920 by Santana Santa RN  Outcome: Progressing     Problem: GASTROINTESTINAL - ADULT  Goal: Minimal or absence of nausea and/or vomiting  Description: INTERVENTIONS:  - Administer IV fluids if ordered to ensure adequate hydration  - Maintain NPO status until nausea and vomiting are resolved  - Nasogastric tube if ordered  - Administer ordered antiemetic medications as needed  - Provide nonpharmacologic comfort measures as appropriate  - Advance diet as tolerated, if ordered  - Consider nutrition services referral to assist patient with adequate nutrition and appropriate food choices  5/18/2023 1053 by Santana Sanat RN  Outcome: Adequate for Discharge  5/18/2023 0920 by Santana Santa RN  Outcome: Progressing  Goal: Maintains or returns to baseline bowel function  Description: INTERVENTIONS:  - Assess bowel function  - Encourage oral fluids to ensure adequate hydration  - Administer IV fluids if ordered to ensure adequate hydration  - Administer ordered medications as needed  - Encourage mobilization and activity  - Consider nutritional services referral to assist patient with adequate nutrition and appropriate food choices  5/18/2023 1053 by Jose Antonio Reyes RN  Outcome: Adequate for Discharge  5/18/2023 0920 by Jose Antonio Reyes RN  Outcome: Progressing  Goal: Maintains adequate nutritional intake  Description: INTERVENTIONS:  - Monitor percentage of each meal consumed  - Identify factors contributing to decreased intake, treat as appropriate  - Assist with meals as needed  - Monitor I&O, weight, and lab values if indicated  - Obtain nutrition services referral as needed  5/18/2023 1053 by Jose Antonio Reyes RN  Outcome: Adequate for Discharge  5/18/2023 0920 by Jose Antonio Reyes RN  Outcome: Progressing     Problem: METABOLIC, FLUID AND ELECTROLYTES - ADULT  Goal: Electrolytes maintained within normal limits  Description: INTERVENTIONS:  - Monitor labs and assess patient for signs and symptoms of electrolyte imbalances  - Administer electrolyte replacement as ordered  - Monitor response to electrolyte replacements, including repeat lab results as appropriate  - Instruct patient on fluid and nutrition as appropriate  5/18/2023 1053 by Jose Antonio Reyes RN  Outcome: Adequate for Discharge  5/18/2023 0920 by Jose Antonio Reyes RN  Outcome: Progressing  Goal: Fluid balance maintained  Description: INTERVENTIONS:  - Monitor labs   - Monitor I/O and WT  - Instruct patient on fluid and nutrition as appropriate  - Assess for signs & symptoms of volume excess or deficit  5/18/2023 1053 by Jose Antonio Reyes RN  Outcome: Adequate for Discharge  5/18/2023 0920 by Jose Antonio Reyes RN  Outcome: Progressing     Problem: Nutrition/Hydration-ADULT  Goal: Nutrient/Hydration intake appropriate for improving, restoring or maintaining nutritional needs  Description: Monitor and assess patient's nutrition/hydration status for malnutrition  Collaborate with interdisciplinary team and initiate plan and interventions as ordered  Monitor patient's weight and dietary intake as ordered or per policy   Utilize nutrition screening tool and intervene as necessary  Determine patient's food preferences and provide high-protein, high-caloric foods as appropriate       INTERVENTIONS:  - Monitor oral intake, urinary output, labs, and treatment plans  - Assess nutrition and hydration status and recommend course of action  - Evaluate amount of meals eaten  - Assist patient with eating if necessary   - Allow adequate time for meals  - Recommend/ encourage appropriate diets, oral nutritional supplements, and vitamin/mineral supplements  - Order, calculate, and assess calorie counts as needed  - Recommend, monitor, and adjust tube feedings and TPN/PPN based on assessed needs  - Assess need for intravenous fluids  - Provide specific nutrition/hydration education as appropriate  - Include patient/family/caregiver in decisions related to nutrition  5/18/2023 1053 by Elmer Boykin RN  Outcome: Adequate for Discharge  5/18/2023 0920 by Elmer Boykin RN  Outcome: Progressing     Problem: SKIN/TISSUE INTEGRITY - ADULT  Goal: Skin Integrity remains intact(Skin Breakdown Prevention)  Description: Assess:  -Perform Dawson assessment every x  -Clean and moisturize skin every x  -Inspect skin when repositioning, toileting, and assisting with ADLS  -Assess under medical devices such as x every x  -Assess extremities for adequate circulation and sensation     Bed Management:  -Have minimal linens on bed & keep smooth, unwrinkled  -Change linens as needed when moist or perspiring  -Avoid sitting or lying in one position for more than x hours while in bed  -Keep HOB at [facility]     Toileting:  -Offer bedside commode  -Assess for incontinence every x  -Use incontinent care products after each incontinent episode such as x    Activity:  -Mobilize patient x times a day  -Encourage activity and walks on unit  -Encourage or provide ROM exercises   -Turn and reposition patient every x Hours  -Use appropriate equipment to lift or move patient in bed  -Instruct/ Assist with weight shifting every x when out of bed in chair  -Consider limitation of chair time x hour intervals    Skin Care:  -Avoid use of baby powder, tape, friction and shearing, hot water or constrictive clothing  -Relieve pressure over bony prominences using x  -Do not massage red bony areas    Next Steps:  -Teach patient strategies to minimize risks such as x   -Consider consults to  interdisciplinary teams such as x  5/18/2023 1053 by Joseph Matthews RN  Outcome: Adequate for Discharge  5/18/2023 0920 by Joseph Matthews RN  Outcome: Progressing     Problem: HEMATOLOGIC - ADULT  Goal: Maintains hematologic stability  Description: INTERVENTIONS  - Assess for signs and symptoms of bleeding or hemorrhage  - Monitor labs  - Administer supportive blood products/factors as ordered and appropriate  5/18/2023 1053 by Joseph Matthews RN  Outcome: Adequate for Discharge  5/18/2023 0920 by Joseph Matthews RN  Outcome: Progressing     Problem: MUSCULOSKELETAL - ADULT  Goal: Maintain or return mobility to safest level of function  Description: INTERVENTIONS:  - Assess patient's ability to carry out ADLs; assess patient's baseline for ADL function and identify physical deficits which impact ability to perform ADLs (bathing, care of mouth/teeth, toileting, grooming, dressing, etc )  - Assess/evaluate cause of self-care deficits   - Assess range of motion  - Assess patient's mobility  - Assess patient's need for assistive devices and provide as appropriate  - Encourage maximum independence but intervene and supervise when necessary  - Involve family in performance of ADLs  - Assess for home care needs following discharge   - Consider OT consult to assist with ADL evaluation and planning for discharge  - Provide patient education as appropriate  5/18/2023 1053 by Joseph Matthews RN  Outcome: Adequate for Discharge  5/18/2023 0920 by Joseph Matthews RN  Outcome: Progressing     Problem: MOBILITY - ADULT  Goal: Maintain or return to baseline ADL function  Description: INTERVENTIONS:  -  Assess patient's ability to carry out ADLs; assess patient's baseline for ADL function and identify physical deficits which impact ability to perform ADLs (bathing, care of mouth/teeth, toileting, grooming, dressing, etc )  - Assess/evaluate cause of self-care deficits   - Assess range of motion  - Assess patient's mobility; develop plan if impaired  - Assess patient's need for assistive devices and provide as appropriate  - Encourage maximum independence but intervene and supervise when necessary  - Involve family in performance of ADLs  - Assess for home care needs following discharge   - Consider OT consult to assist with ADL evaluation and planning for discharge  - Provide patient education as appropriate  5/18/2023 1053 by Sridhar Kirkpatrick RN  Outcome: Adequate for Discharge  5/18/2023 0920 by Sridhar Kirkpatrick RN  Outcome: Progressing  Goal: Maintains/Returns to pre admission functional level  Description: INTERVENTIONS:  - Perform BMAT or MOVE assessment daily    - Set and communicate daily mobility goal to care team and patient/family/caregiver  - Collaborate with rehabilitation services on mobility goals if consulted  - Perform Range of Motion x times a day  - Reposition patient every x hours    - Dangle patient x times a day  - Stand patient xxxxxxx times a day  - Ambulate patient x times a day  - Out of bed to chair x times a day   - Out of bed for meals x times a day  - Out of bed for toileting  - Record patient progress and toleration of activity level   5/18/2023 1053 by Sridhar Kirkpatrick RN  Outcome: Adequate for Discharge  5/18/2023 0920 by Sridhar Kirkpatrick RN  Outcome: Progressing     Problem: Prexisting or High Potential for Compromised Skin Integrity  Goal: Skin integrity is maintained or improved  Description: INTERVENTIONS:  - Identify patients at risk for skin breakdown  - Assess and monitor skin integrity  - Assess and monitor nutrition and hydration status  - Monitor labs   - Assess for incontinence   - Turn and reposition patient  - Assist with mobility/ambulation  - Relieve pressure over bony prominences  - Avoid friction and shearing  - Provide appropriate hygiene as needed including keeping skin clean and dry  - Evaluate need for skin moisturizer/barrier cream  - Collaborate with interdisciplinary team   - Patient/family teaching  - Consider wound care consult   5/18/2023 1053 by Devonte Ortega RN  Outcome: Adequate for Discharge  5/18/2023 0920 by Devonte Ortega RN  Outcome: Progressing     Problem: Potential for Falls  Goal: Patient will remain free of falls  Description: INTERVENTIONS:  - Educate patient/family on patient safety including physical limitations  - Instruct patient to call for assistance with activity   - Consult OT/PT to assist with strengthening/mobility   - Keep Call bell within reach  - Keep bed low and locked with side rails adjusted as appropriate  - Keep care items and personal belongings within reach  - Initiate and maintain comfort rounds  - Make Fall Risk Sign visible to staff  - Offer Toileting every 2 Hours, in advance of need  - Initiate/Maintain alarm  - Obtain necessary fall risk management equipment: socks  - Apply yellow socks and bracelet for high fall risk patients  - Consider moving patient to room near nurses station  5/18/2023 1053 by Devonte Ortega RN  Outcome: Adequate for Discharge  5/18/2023 0920 by Devonte Ortega RN  Outcome: Progressing

## 2023-05-18 NOTE — PLAN OF CARE
Problem: PAIN - ADULT  Goal: Verbalizes/displays adequate comfort level or baseline comfort level  Description: Interventions:  - Encourage patient to monitor pain and request assistance  - Assess pain using appropriate pain scale  - Administer analgesics based on type and severity of pain and evaluate response  - Implement non-pharmacological measures as appropriate and evaluate response  - Consider cultural and social influences on pain and pain management  - Notify physician/advanced practitioner if interventions unsuccessful or patient reports new pain  Outcome: Progressing     Problem: INFECTION - ADULT  Goal: Absence or prevention of progression during hospitalization  Description: INTERVENTIONS:  - Assess and monitor for signs and symptoms of infection  - Monitor lab/diagnostic results  - Monitor all insertion sites, i e  indwelling lines, tubes, and drains  - Monitor endotracheal if appropriate and nasal secretions for changes in amount and color  - [place] appropriate cooling/warming therapies per order  - Administer medications as ordered  - Instruct and encourage patient and family to use good hand hygiene technique  - Identify and instruct in appropriate isolation precautions for identified infection/condition  Outcome: Progressing     Problem: SAFETY ADULT  Goal: Patient will remain free of falls  Description: INTERVENTIONS:  - Educate patient/family on patient safety including physical limitations  - Instruct patient to call for assistance with activity   - Consult OT/PT to assist with strengthening/mobility   - Keep Call bell within reach  - Keep bed low and locked with side rails adjusted as appropriate  - Keep care items and personal belongings within reach  - Initiate and maintain comfort rounds  - Make Fall Risk Sign visible to staff  - Offer Toileting every 2 Hours, in advance of need  - Initiate/Maintain alarm  - Obtain necessary fall risk management equipment: socks  - Apply yellow socks and bracelet for high fall risk patients  - Consider moving patient to room near nurses station  Outcome: Progressing     Problem: DISCHARGE PLANNING  Goal: Discharge to home or other facility with appropriate resources  Description: INTERVENTIONS:  - Identify barriers to discharge w/patient and caregiver  - Arrange for needed discharge resources and transportation as appropriate  - Identify discharge learning needs (meds, wound care, etc )  - Arrange for interpretive services to assist at discharge as needed  - Refer to Case Management Department for coordinating discharge planning if the patient needs post-hospital services based on physician/advanced practitioner order or complex needs related to functional status, cognitive ability, or social support system  Outcome: Progressing     Problem: Knowledge Deficit  Goal: Patient/family/caregiver demonstrates understanding of disease process, treatment plan, medications, and discharge instructions  Description: Complete learning assessment and assess knowledge base    Interventions:  - Provide teaching at level of understanding  - Provide teaching via preferred learning methods  Outcome: Progressing     Problem: CARDIOVASCULAR - ADULT  Goal: Maintains optimal cardiac output and hemodynamic stability  Description: INTERVENTIONS:  - Monitor I/O, vital signs and rhythm  - Monitor for S/S and trends of decreased cardiac output  - Administer and titrate ordered vasoactive medications to optimize hemodynamic stability  - Assess quality of pulses, skin color and temperature  - Assess for signs of decreased coronary artery perfusion  - Instruct patient to report change in severity of symptoms  Outcome: Progressing  Goal: Absence of cardiac dysrhythmias or at baseline rhythm  Description: INTERVENTIONS:  - Continuous cardiac monitoring, vital signs, obtain 12 lead EKG if ordered  - Administer antiarrhythmic and heart rate control medications as ordered  - Monitor electrolytes and administer replacement therapy as ordered  Outcome: Progressing     Problem: GASTROINTESTINAL - ADULT  Goal: Minimal or absence of nausea and/or vomiting  Description: INTERVENTIONS:  - Administer IV fluids if ordered to ensure adequate hydration  - Maintain NPO status until nausea and vomiting are resolved  - Nasogastric tube if ordered  - Administer ordered antiemetic medications as needed  - Provide nonpharmacologic comfort measures as appropriate  - Advance diet as tolerated, if ordered  - Consider nutrition services referral to assist patient with adequate nutrition and appropriate food choices  Outcome: Progressing  Goal: Maintains or returns to baseline bowel function  Description: INTERVENTIONS:  - Assess bowel function  - Encourage oral fluids to ensure adequate hydration  - Administer IV fluids if ordered to ensure adequate hydration  - Administer ordered medications as needed  - Encourage mobilization and activity  - Consider nutritional services referral to assist patient with adequate nutrition and appropriate food choices  Outcome: Progressing  Goal: Maintains adequate nutritional intake  Description: INTERVENTIONS:  - Monitor percentage of each meal consumed  - Identify factors contributing to decreased intake, treat as appropriate  - Assist with meals as needed  - Monitor I&O, weight, and lab values if indicated  - Obtain nutrition services referral as needed  Outcome: Progressing     Problem: METABOLIC, FLUID AND ELECTROLYTES - ADULT  Goal: Electrolytes maintained within normal limits  Description: INTERVENTIONS:  - Monitor labs and assess patient for signs and symptoms of electrolyte imbalances  - Administer electrolyte replacement as ordered  - Monitor response to electrolyte replacements, including repeat lab results as appropriate  - Instruct patient on fluid and nutrition as appropriate  Outcome: Progressing  Goal: Fluid balance maintained  Description: INTERVENTIONS:  - Monitor labs   - Monitor I/O and WT  - Instruct patient on fluid and nutrition as appropriate  - Assess for signs & symptoms of volume excess or deficit  Outcome: Progressing     Problem: Nutrition/Hydration-ADULT  Goal: Nutrient/Hydration intake appropriate for improving, restoring or maintaining nutritional needs  Description: Monitor and assess patient's nutrition/hydration status for malnutrition  Collaborate with interdisciplinary team and initiate plan and interventions as ordered  Monitor patient's weight and dietary intake as ordered or per policy  Utilize nutrition screening tool and intervene as necessary  Determine patient's food preferences and provide high-protein, high-caloric foods as appropriate       INTERVENTIONS:  - Monitor oral intake, urinary output, labs, and treatment plans  - Assess nutrition and hydration status and recommend course of action  - Evaluate amount of meals eaten  - Assist patient with eating if necessary   - Allow adequate time for meals  - Recommend/ encourage appropriate diets, oral nutritional supplements, and vitamin/mineral supplements  - Order, calculate, and assess calorie counts as needed  - Recommend, monitor, and adjust tube feedings and TPN/PPN based on assessed needs  - Assess need for intravenous fluids  - Provide specific nutrition/hydration education as appropriate  - Include patient/family/caregiver in decisions related to nutrition  Outcome: Progressing     Problem: SKIN/TISSUE INTEGRITY - ADULT  Goal: Skin Integrity remains intact(Skin Breakdown Prevention)  Description: Assess:  -Perform Dawson assessment every x  -Clean and moisturize skin every x  -Inspect skin when repositioning, toileting, and assisting with ADLS  -Assess under medical devices such as x every x  -Assess extremities for adequate circulation and sensation     Bed Management:  -Have minimal linens on bed & keep smooth, unwrinkled  -Change linens as needed when moist or perspiring  -Avoid sitting or lying in one position for more than x hours while in bed  -Keep HOB at [facility]     Toileting:  -Offer bedside commode  -Assess for incontinence every x  -Use incontinent care products after each incontinent episode such as x    Activity:  -Mobilize patient x times a day  -Encourage activity and walks on unit  -Encourage or provide ROM exercises   -Turn and reposition patient every x Hours  -Use appropriate equipment to lift or move patient in bed  -Instruct/ Assist with weight shifting every x when out of bed in chair  -Consider limitation of chair time x hour intervals    Skin Care:  -Avoid use of baby powder, tape, friction and shearing, hot water or constrictive clothing  -Relieve pressure over bony prominences using x  -Do not massage red bony areas    Next Steps:  -Teach patient strategies to minimize risks such as x   -Consider consults to  interdisciplinary teams such as x  Outcome: Progressing     Problem: HEMATOLOGIC - ADULT  Goal: Maintains hematologic stability  Description: INTERVENTIONS  - Assess for signs and symptoms of bleeding or hemorrhage  - Monitor labs  - Administer supportive blood products/factors as ordered and appropriate  Outcome: Progressing     Problem: MUSCULOSKELETAL - ADULT  Goal: Maintain or return mobility to safest level of function  Description: INTERVENTIONS:  - Assess patient's ability to carry out ADLs; assess patient's baseline for ADL function and identify physical deficits which impact ability to perform ADLs (bathing, care of mouth/teeth, toileting, grooming, dressing, etc )  - Assess/evaluate cause of self-care deficits   - Assess range of motion  - Assess patient's mobility  - Assess patient's need for assistive devices and provide as appropriate  - Encourage maximum independence but intervene and supervise when necessary  - Involve family in performance of ADLs  - Assess for home care needs following discharge   - Consider OT consult to assist with ADL evaluation and planning for discharge  - Provide patient education as appropriate  Outcome: Progressing     Problem: MOBILITY - ADULT  Goal: Maintain or return to baseline ADL function  Description: INTERVENTIONS:  -  Assess patient's ability to carry out ADLs; assess patient's baseline for ADL function and identify physical deficits which impact ability to perform ADLs (bathing, care of mouth/teeth, toileting, grooming, dressing, etc )  - Assess/evaluate cause of self-care deficits   - Assess range of motion  - Assess patient's mobility; develop plan if impaired  - Assess patient's need for assistive devices and provide as appropriate  - Encourage maximum independence but intervene and supervise when necessary  - Involve family in performance of ADLs  - Assess for home care needs following discharge   - Consider OT consult to assist with ADL evaluation and planning for discharge  - Provide patient education as appropriate  Outcome: Progressing  Goal: Maintains/Returns to pre admission functional level  Description: INTERVENTIONS:  - Perform BMAT or MOVE assessment daily    - Set and communicate daily mobility goal to care team and patient/family/caregiver  - Collaborate with rehabilitation services on mobility goals if consulted  - Perform Range of Motion x times a day  - Reposition patient every x hours    - Dangle patient x times a day  - Stand patient x times a day  - Ambulate patient x times a day  - Out of bed to chair x times a day   - Out of bed for meals x times a day  - Out of bed for toileting  - Record patient progress and toleration of activity level   Outcome: Progressing     Problem: Prexisting or High Potential for Compromised Skin Integrity  Goal: Skin integrity is maintained or improved  Description: INTERVENTIONS:  - Identify patients at risk for skin breakdown  - Assess and monitor skin integrity  - Assess and monitor nutrition and hydration status  - Monitor labs   - Assess for incontinence   - Turn and reposition patient  - Assist with mobility/ambulation  - Relieve pressure over bony prominences  - Avoid friction and shearing  - Provide appropriate hygiene as needed including keeping skin clean and dry  - Evaluate need for skin moisturizer/barrier cream  - Collaborate with interdisciplinary team   - Patient/family teaching  - Consider wound care consult   Outcome: Progressing     Problem: Potential for Falls  Goal: Patient will remain free of falls  Description: INTERVENTIONS:  - Educate patient/family on patient safety including physical limitations  - Instruct patient to call for assistance with activity   - Consult OT/PT to assist with strengthening/mobility   - Keep Call bell within reach  - Keep bed low and locked with side rails adjusted as appropriate  - Keep care items and personal belongings within reach  - Initiate and maintain comfort rounds  - Make Fall Risk Sign visible to staff  - Offer Toileting every 2 Hours, in advance of need  - Initiate/Maintain alarm  - Obtain necessary fall risk management equipment: socks  - Apply yellow socks and bracelet for high fall risk patients  - Consider moving patient to room near nurses station  Outcome: Progressing

## 2023-05-18 NOTE — DISCHARGE SUMMARY
[facility]  Discharge- Wanna Fearing 1985, 42 y o  male MRN: [id number]  Unit/Bed#: -01 Encounter: [id number]  Primary Care Provider: Rupesh Eid MD   Date and time admitted to hospital: 5/15/2023  6:00 PM    * Nausea vomiting and diarrhea  Assessment & Plan  · Presents from home due to nausea, vomiting and diarrhea over the past 2 days  Reports his son was sick with similar illness 1 week ago  However, son's symptoms lasted for less than 24 hours  · Potassium 3 4  · WBC 16 86 and Hg 17 1  Suspect hemoconcentration from dehydration  · Improved to WBC 4 and hgb 13  · QTc elevated in the ER  Tigan given  · Suspect viral GI illness  Would hold off on starting antibiotics at this time   · COVID/flu was negative  · C  difficile negative, stool enteric testing negative  · CT A/P consistent with diarrheal illness, no acute pathology  · Reports diarrhea has stopped  WBC down trended, afebrile x48 hours  Stable for discharge    Viral sepsis ([address] )  Assessment & Plan  · N/V/D x 2 days  Diarrhea has improved  Continues to have nausea and vomiting   · SIRS criteria:   · Tachycardia, fever, leukocytosis  · Lactic and procal ordered    · SOI: Suspected viral GI illness   · ABx: None  Monitored off abx   · Stool studies negative  · Tachycardia, fever, leukocytosis resolved    Nicotine abuse  Assessment & Plan  · Vapes nicotine daily   · Encourage cessation     Anxiety and depression  Assessment & Plan  · Continue home lexapro and wellbutrin     GERD (gastroesophageal reflux disease)  Assessment & Plan  · Takes Prilosec outpatient   · IV PPI while inpatient    Partial anomalous pulmonary venous return (PAPVR)  Assessment & Plan  · 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     · [facility] with [facility] cardi[facility]    · ECHO performed in January 2023: EF 19-51%, normal diastolic function     Prolonged Q-T interval on ECG-resolved as of 5/18/2023  Assessment & Plan  · QT//627 on EKG in the ER   · Prior /421 (04/2022)   · Suspected prolongation due to Nausea, vomiting, diarrhea causing dehydration   · Administer potassium and IV magnesium   · IV fluids   · Telemetry   · Resolved    Elevated serum creatinine-resolved as of 5/17/2023  Assessment & Plan  · Cr 1 38  · Baseline 1-1 2  · Does not meet JOSE ALEJANDRO criteria   · Suspect elevation in setting of nausea, vomiting, diarrhea  · Receiving IV fluids  · Resolved, creatinine 0 8        Medical Problems     Resolved Problems  Date Reviewed: 5/18/2023          Resolved    Elevated serum creatinine 5/17/2023     Resolved by  Gust Galeazzi, PA-C    Prolonged Q-T interval on ECG 5/18/2023     Resolved by  Gust Galeazzi, PA-C        Discharging Physician / Practitioner: Gust Galeazzi, PA-C  PCP: Benoit Cotter MD  Admission Date:   Admission Orders (From admission, onward)     Ordered        05/17/23 1350  Inpatient Admission  Once            05/15/23 2058  Place in Observation  Once                      Discharge Date: 05/18/23    Consultations During Hospital Stay:  · Cardiology    Procedures Performed:   · EKG with QTc 627 on admission  · CT AP- liquid stool in the right hemicolon, compatible with known diarrheal illness    Significant Findings / Test Results:   · WBC 16 8 on arrival  · Lactic 1 0  · Procalcitonin 0 24  · C diff negative, Enteric stool testing    Incidental Findings:   · None    Test Results Pending at Discharge (will require follow up): · None     Outpatient Tests Requested:  · None    Complications: None    Reason for Admission: Viral sepsis    Hospital Course:   Ajay Lopez is a 42 y o  male patient with PMH PAPVR, anxiety, depression, tobacco use who originally presented to the hospital on 5/15/2023 due to nausea, vomiting, diarrhea x3 days  Reports that son had similar illness 1 week ago    On arrival to the ED, was meeting sepsis criteria with fever Tmax 102 8, leukocytosis "with WBC 16, tachycardia  Underwent CT abdomen/pelvis which showed liquid stool in the right hemicolon compatible with diarrheal illness, no acute abnormality otherwise  Underwent C  difficile and stool enteric testing which were both negative  Received IVF and WBC down trended  Prior to discharge, patient was afebrile x48 hours  His diarrhea has resolved  This was likely viral sepsis in the setting of viral gastroenteritis  No additional lab work or follow-up required  Please see above list of diagnoses and related plan for additional information  Condition at Discharge: good    Discharge Day Visit / Exam:   Subjective: Patient reports feeling significantly better, ready for discharge  Vitals: Blood Pressure: 125/76 (05/18/23 0807)  Pulse: 81 (05/18/23 0807)  Temperature: 99 2 °F (37 3 °C) (05/18/23 0807)  Temp Source: Oral (05/16/23 0540)  Respirations: 16 (05/17/23 2048)  Height: 5' 7\" (170 2 cm) (05/15/23 2030)  Weight - Scale: 76 8 kg (169 lb 6 4 oz) (05/18/23 0438)  SpO2: 96 % (05/18/23 0807)  Exam:   Physical Exam  Vitals and nursing note reviewed  Constitutional:       Appearance: Normal appearance  HENT:      Head: Normocephalic and atraumatic  Mouth/Throat:      Mouth: Mucous membranes are moist       Pharynx: Oropharynx is clear  No oropharyngeal exudate  Eyes:      Extraocular Movements: Extraocular movements intact  Cardiovascular:      Rate and Rhythm: Normal rate and regular rhythm  Pulses: Normal pulses  Heart sounds: Normal heart sounds  No murmur heard  No friction rub  No gallop  Pulmonary:      Effort: Pulmonary effort is normal  No respiratory distress  Breath sounds: Normal breath sounds  No stridor  No wheezing or rales  Abdominal:      General: Abdomen is flat  Bowel sounds are normal  There is no distension  Palpations: Abdomen is soft  Tenderness: There is no abdominal tenderness  Musculoskeletal:      Right lower leg: No edema   " Left lower leg: No edema  Skin:     General: Skin is warm and dry  Neurological:      General: No focal deficit present  Mental Status: He is alert and oriented to person, place, and time  Discussion with Family: Patient declined call to   Discharge instructions/Information to patient and family:   See after visit summary for information provided to patient and family  Provisions for Follow-Up Care:  See after visit summary for information related to follow-up care and any pertinent home health orders  Disposition:   Home    Planned Readmission: None     Discharge Statement:  I spent 60 minutes discharging the patient  This time was spent on the day of discharge  I had direct contact with the patient on the day of discharge  Greater than 50% of the total time was spent examining patient, answering all patient questions, arranging and discussing plan of care with patient as well as directly providing post-discharge instructions  Additional time then spent on discharge activities  Discharge Medications:  See after visit summary for reconciled discharge medications provided to patient and/or family        **Please Note: This note may have been constructed using a voice recognition system**

## 2023-05-18 NOTE — UTILIZATION REVIEW
NOTIFICATION OF INPATIENT ADMISSION   AUTHORIZATION REQUEST   SERVICING FACILITY:   [facility]  [address] [place] [address]  Tax ID: [id number]  NPI: [id number] ATTENDING PROVIDER:  Attending Name and NPI#: Parul Mae Md [[id number]]  Address: [address] [place] [address]  Phone: [phone]   ADMISSION INFORMATION:  Place of Service: [address] [place] [address] Code: 21  Inpatient Admission Date/Time: 5/17/23  1:50 PM  Discharge Date/Time: 5/18/2023 11:26 AM  Admitting Diagnosis Code/Description:  Vomiting [R11 10]  Tachycardia [R00 0]  Prolonged Q-T interval on ECG [R94 31]  Nausea vomiting and diarrhea [R11 2, R19 7]     UTILIZATION REVIEW CONTACT:  [facility] Utilization   Network Utilization Review Department  Phone: [phone]  Fax [phone]  Email: [email or web address]  Contact for approvals/pending authorizations, clinical reviews, and discharge  PHYSICIAN ADVISORY SERVICES:  Medical Necessity Denial & [facility] Review  Phone: [phone]  Fax: [phone]  Email: [email or web address]

## 2023-05-18 NOTE — ASSESSMENT & PLAN NOTE
· Presents from home due to nausea, vomiting and diarrhea over the past 2 days  Reports his son was sick with similar illness 1 week ago  However, son's symptoms lasted for less than 24 hours  · Potassium 3 4  · WBC 16 86 and Hg 17 1  Suspect hemoconcentration from dehydration  · Improved to WBC 4 and hgb 13  · QTc elevated in the ER  Tigan given  · Suspect viral GI illness  Would hold off on starting antibiotics at this time   · COVID/flu was negative  · C  difficile negative, stool enteric testing negative  · CT A/P consistent with diarrheal illness, no acute pathology  · Reports diarrhea has stopped  WBC down trended, afebrile x48 hours    Stable for discharge

## 2023-05-18 NOTE — ASSESSMENT & PLAN NOTE
· N/V/D x 2 days  Diarrhea has improved  Continues to have nausea and vomiting   · SIRS criteria:   · Tachycardia, fever, leukocytosis  · Lactic and procal ordered    · SOI: Suspected viral GI illness   · ABx: None  Monitored off abx     · Stool studies negative  · Tachycardia, fever, leukocytosis resolved

## 2023-05-19 LAB
ATRIAL RATE: 102 BPM
ATRIAL RATE: 117 BPM
ATRIAL RATE: 120 BPM
ATRIAL RATE: 132 BPM
P AXIS: 49 DEGREES
P AXIS: 57 DEGREES
PR INTERVAL: 104 MS
PR INTERVAL: 112 MS
PR INTERVAL: 146 MS
PR INTERVAL: 96 MS
QRS AXIS: 32 DEGREES
QRS AXIS: 69 DEGREES
QRS AXIS: 74 DEGREES
QRS AXIS: 78 DEGREES
QRSD INTERVAL: 86 MS
QRSD INTERVAL: 86 MS
QRSD INTERVAL: 90 MS
QRSD INTERVAL: 98 MS
QT INTERVAL: 336 MS
QT INTERVAL: 396 MS
QT INTERVAL: 444 MS
QT INTERVAL: 450 MS
QTC INTERVAL: 437 MS
QTC INTERVAL: 586 MS
QTC INTERVAL: 627 MS
QTC INTERVAL: 627 MS
T WAVE AXIS: 51 DEGREES
T WAVE AXIS: 69 DEGREES
T WAVE AXIS: 74 DEGREES
T WAVE AXIS: 75 DEGREES
VENTRICULAR RATE: 102 BPM
VENTRICULAR RATE: 117 BPM
VENTRICULAR RATE: 120 BPM
VENTRICULAR RATE: 132 BPM